# Patient Record
Sex: MALE | Race: WHITE | Employment: PART TIME | URBAN - NONMETROPOLITAN AREA
[De-identification: names, ages, dates, MRNs, and addresses within clinical notes are randomized per-mention and may not be internally consistent; named-entity substitution may affect disease eponyms.]

---

## 2021-01-07 ENCOUNTER — HOSPITAL ENCOUNTER (OUTPATIENT)
Dept: CT IMAGING | Age: 51
Discharge: HOME | End: 2021-01-07
Payer: MEDICAID

## 2021-01-07 DIAGNOSIS — R10.84 ABDOMINAL PAIN, GENERALIZED: ICD-10-CM

## 2021-01-07 PROCEDURE — 6360000004 HC RX CONTRAST MEDICATION: Performed by: PHYSICIAN ASSISTANT

## 2021-01-07 PROCEDURE — 74177 CT ABD & PELVIS W/CONTRAST: CPT

## 2021-01-07 RX ADMIN — IOPAMIDOL 75 ML: 755 INJECTION, SOLUTION INTRAVENOUS at 13:22

## 2021-01-07 RX ADMIN — IOHEXOL 50 ML: 240 INJECTION, SOLUTION INTRATHECAL; INTRAVASCULAR; INTRAVENOUS; ORAL at 13:21

## 2021-02-03 ENCOUNTER — HOSPITAL ENCOUNTER (EMERGENCY)
Age: 51
Discharge: HOME | End: 2021-02-03
Attending: STUDENT IN AN ORGANIZED HEALTH CARE EDUCATION/TRAINING PROGRAM
Payer: MEDICAID

## 2021-02-03 ENCOUNTER — APPOINTMENT (OUTPATIENT)
Dept: ULTRASOUND IMAGING | Age: 51
End: 2021-02-03
Payer: MEDICAID

## 2021-02-03 VITALS
DIASTOLIC BLOOD PRESSURE: 84 MMHG | HEART RATE: 83 BPM | WEIGHT: 182 LBS | SYSTOLIC BLOOD PRESSURE: 148 MMHG | OXYGEN SATURATION: 98 % | TEMPERATURE: 98.9 F | RESPIRATION RATE: 16 BRPM | HEIGHT: 65 IN | BODY MASS INDEX: 30.32 KG/M2

## 2021-02-03 DIAGNOSIS — R19.7 DIARRHEA, UNSPECIFIED TYPE: ICD-10-CM

## 2021-02-03 DIAGNOSIS — R10.11 ABDOMINAL PAIN, RIGHT UPPER QUADRANT: Primary | ICD-10-CM

## 2021-02-03 DIAGNOSIS — R11.2 NAUSEA AND VOMITING, INTRACTABILITY OF VOMITING NOT SPECIFIED, UNSPECIFIED VOMITING TYPE: ICD-10-CM

## 2021-02-03 LAB
A/G RATIO: 1.7 (ref 1.1–2.2)
ALBUMIN SERPL-MCNC: 4.3 G/DL (ref 3.4–5)
ALP BLD-CCNC: 106 U/L (ref 40–129)
ALT SERPL-CCNC: 24 U/L (ref 10–40)
ANION GAP SERPL CALCULATED.3IONS-SCNC: 9 MMOL/L (ref 3–16)
AST SERPL-CCNC: 15 U/L (ref 15–37)
BASOPHILS ABSOLUTE: 0.3 K/UL (ref 0–0.2)
BASOPHILS RELATIVE PERCENT: 2.5 %
BILIRUB SERPL-MCNC: 0.3 MG/DL (ref 0–1)
BILIRUBIN URINE: NEGATIVE
BLOOD, URINE: NEGATIVE
BUN BLDV-MCNC: 13 MG/DL (ref 7–20)
CALCIUM SERPL-MCNC: 9.8 MG/DL (ref 8.3–10.6)
CHLORIDE BLD-SCNC: 97 MMOL/L (ref 99–110)
CLARITY: CLEAR
CO2: 26 MMOL/L (ref 21–32)
COLOR: YELLOW
CREAT SERPL-MCNC: 0.6 MG/DL (ref 0.9–1.3)
EKG ATRIAL RATE: 87 BPM
EKG DIAGNOSIS: NORMAL
EKG P AXIS: 56 DEGREES
EKG P-R INTERVAL: 182 MS
EKG Q-T INTERVAL: 356 MS
EKG QRS DURATION: 98 MS
EKG QTC CALCULATION (BAZETT): 428 MS
EKG R AXIS: 77 DEGREES
EKG T AXIS: 80 DEGREES
EKG VENTRICULAR RATE: 87 BPM
EOSINOPHILS ABSOLUTE: 0.2 K/UL (ref 0–0.6)
EOSINOPHILS RELATIVE PERCENT: 1.3 %
GFR AFRICAN AMERICAN: >60
GFR NON-AFRICAN AMERICAN: >60
GLOBULIN: 2.6 G/DL
GLUCOSE BLD-MCNC: 305 MG/DL (ref 70–99)
GLUCOSE URINE: >=1000 MG/DL
HCT VFR BLD CALC: 48.7 % (ref 40.5–52.5)
HEMOGLOBIN: 16.6 G/DL (ref 13.5–17.5)
KETONES, URINE: NEGATIVE MG/DL
LACTIC ACID: 0.9 MMOL/L (ref 0.4–2)
LEUKOCYTE ESTERASE, URINE: NEGATIVE
LIPASE: 9 U/L (ref 13–60)
LYMPHOCYTES ABSOLUTE: 1.9 K/UL (ref 1–5.1)
LYMPHOCYTES RELATIVE PERCENT: 15.8 %
MCH RBC QN AUTO: 32 PG (ref 26–34)
MCHC RBC AUTO-ENTMCNC: 34.1 G/DL (ref 31–36)
MCV RBC AUTO: 93.9 FL (ref 80–100)
MICROSCOPIC EXAMINATION: ABNORMAL
MONOCYTES ABSOLUTE: 0.7 K/UL (ref 0–1.3)
MONOCYTES RELATIVE PERCENT: 5.6 %
NEUTROPHILS ABSOLUTE: 9.1 K/UL (ref 1.7–7.7)
NEUTROPHILS RELATIVE PERCENT: 74.8 %
NITRITE, URINE: NEGATIVE
PDW BLD-RTO: 13.7 % (ref 12.4–15.4)
PH UA: 6 (ref 5–8)
PLATELET # BLD: 205 K/UL (ref 135–450)
PMV BLD AUTO: 8.2 FL (ref 5–10.5)
POTASSIUM REFLEX MAGNESIUM: 4.3 MMOL/L (ref 3.5–5.1)
PROTEIN UA: NEGATIVE MG/DL
RBC # BLD: 5.18 M/UL (ref 4.2–5.9)
SODIUM BLD-SCNC: 132 MMOL/L (ref 136–145)
SPECIFIC GRAVITY UA: 1.02 (ref 1–1.03)
TOTAL PROTEIN: 6.9 G/DL (ref 6.4–8.2)
TROPONIN: <0.01 NG/ML
URINE TYPE: ABNORMAL
UROBILINOGEN, URINE: 0.2 E.U./DL
WBC # BLD: 12.1 K/UL (ref 4–11)

## 2021-02-03 PROCEDURE — 93005 ELECTROCARDIOGRAM TRACING: CPT | Performed by: STUDENT IN AN ORGANIZED HEALTH CARE EDUCATION/TRAINING PROGRAM

## 2021-02-03 PROCEDURE — 99284 EMERGENCY DEPT VISIT MOD MDM: CPT

## 2021-02-03 PROCEDURE — 93010 ELECTROCARDIOGRAM REPORT: CPT | Performed by: INTERNAL MEDICINE

## 2021-02-03 PROCEDURE — 96372 THER/PROPH/DIAG INJ SC/IM: CPT

## 2021-02-03 PROCEDURE — 84484 ASSAY OF TROPONIN QUANT: CPT

## 2021-02-03 PROCEDURE — 85025 COMPLETE CBC W/AUTO DIFF WBC: CPT

## 2021-02-03 PROCEDURE — 80053 COMPREHEN METABOLIC PANEL: CPT

## 2021-02-03 PROCEDURE — 81003 URINALYSIS AUTO W/O SCOPE: CPT

## 2021-02-03 PROCEDURE — 76705 ECHO EXAM OF ABDOMEN: CPT

## 2021-02-03 PROCEDURE — 83605 ASSAY OF LACTIC ACID: CPT

## 2021-02-03 PROCEDURE — 96374 THER/PROPH/DIAG INJ IV PUSH: CPT

## 2021-02-03 PROCEDURE — 96375 TX/PRO/DX INJ NEW DRUG ADDON: CPT

## 2021-02-03 PROCEDURE — 83690 ASSAY OF LIPASE: CPT

## 2021-02-03 PROCEDURE — 6360000002 HC RX W HCPCS: Performed by: STUDENT IN AN ORGANIZED HEALTH CARE EDUCATION/TRAINING PROGRAM

## 2021-02-03 RX ORDER — ONDANSETRON 2 MG/ML
4 INJECTION INTRAMUSCULAR; INTRAVENOUS EVERY 6 HOURS PRN
Status: DISCONTINUED | OUTPATIENT
Start: 2021-02-03 | End: 2021-02-03 | Stop reason: HOSPADM

## 2021-02-03 RX ORDER — VERAPAMIL HYDROCHLORIDE 240 MG/1
TABLET, FILM COATED, EXTENDED RELEASE ORAL
COMMUNITY
Start: 2020-12-29

## 2021-02-03 RX ORDER — GABAPENTIN 100 MG/1
CAPSULE ORAL
COMMUNITY
Start: 2020-12-18

## 2021-02-03 RX ORDER — DICYCLOMINE HYDROCHLORIDE 10 MG/1
10 CAPSULE ORAL 4 TIMES DAILY
Qty: 40 CAPSULE | Refills: 0 | Status: SHIPPED | OUTPATIENT
Start: 2021-02-03 | End: 2021-02-13

## 2021-02-03 RX ORDER — ONDANSETRON 2 MG/ML
4 INJECTION INTRAMUSCULAR; INTRAVENOUS ONCE
Status: COMPLETED | OUTPATIENT
Start: 2021-02-03 | End: 2021-02-03

## 2021-02-03 RX ORDER — SERTRALINE HYDROCHLORIDE 100 MG/1
TABLET, FILM COATED ORAL
COMMUNITY
Start: 2020-12-29

## 2021-02-03 RX ORDER — MORPHINE SULFATE 4 MG/ML
4 INJECTION, SOLUTION INTRAMUSCULAR; INTRAVENOUS ONCE
Status: COMPLETED | OUTPATIENT
Start: 2021-02-03 | End: 2021-02-03

## 2021-02-03 RX ORDER — SILDENAFIL 100 MG/1
TABLET, FILM COATED ORAL
COMMUNITY
Start: 2021-01-06

## 2021-02-03 RX ORDER — OXYCODONE AND ACETAMINOPHEN 10; 325 MG/1; MG/1
1 TABLET ORAL EVERY 6 HOURS PRN
COMMUNITY

## 2021-02-03 RX ORDER — KETOROLAC TROMETHAMINE 30 MG/ML
15 INJECTION, SOLUTION INTRAMUSCULAR; INTRAVENOUS ONCE
Status: COMPLETED | OUTPATIENT
Start: 2021-02-03 | End: 2021-02-03

## 2021-02-03 RX ORDER — ONDANSETRON 4 MG/1
4 TABLET, ORALLY DISINTEGRATING ORAL EVERY 8 HOURS PRN
Qty: 21 TABLET | Refills: 0 | Status: SHIPPED | OUTPATIENT
Start: 2021-02-03

## 2021-02-03 RX ORDER — TRAZODONE HYDROCHLORIDE 100 MG/1
TABLET ORAL
COMMUNITY
Start: 2020-10-29

## 2021-02-03 RX ORDER — DULAGLUTIDE 1.5 MG/.5ML
INJECTION, SOLUTION SUBCUTANEOUS
COMMUNITY
Start: 2020-12-16

## 2021-02-03 RX ORDER — INSULIN GLARGINE 100 [IU]/ML
INJECTION, SOLUTION SUBCUTANEOUS
COMMUNITY
Start: 2020-12-16

## 2021-02-03 RX ORDER — DICYCLOMINE HYDROCHLORIDE 10 MG/ML
20 INJECTION INTRAMUSCULAR ONCE
Status: COMPLETED | OUTPATIENT
Start: 2021-02-03 | End: 2021-02-03

## 2021-02-03 RX ADMIN — DICYCLOMINE HYDROCHLORIDE 20 MG: 20 INJECTION, SOLUTION INTRAMUSCULAR at 11:57

## 2021-02-03 RX ADMIN — ONDANSETRON HYDROCHLORIDE 4 MG: 2 INJECTION, SOLUTION INTRAMUSCULAR; INTRAVENOUS at 14:24

## 2021-02-03 RX ADMIN — MORPHINE SULFATE 4 MG: 4 INJECTION INTRAVENOUS at 14:24

## 2021-02-03 RX ADMIN — KETOROLAC TROMETHAMINE 15 MG: 30 INJECTION, SOLUTION INTRAMUSCULAR at 11:56

## 2021-02-03 ASSESSMENT — PAIN DESCRIPTION - LOCATION
LOCATION: ABDOMEN
LOCATION: ABDOMEN

## 2021-02-03 ASSESSMENT — PAIN SCALES - GENERAL
PAINLEVEL_OUTOF10: 7
PAINLEVEL_OUTOF10: 7

## 2021-02-03 NOTE — ED PROVIDER NOTES
[address] ED      CHIEF COMPLAINT  Abdominal Pain (john 10 was here sent to [facility]; needs gallbladder removed but was septic so was not removed)       HISTORY OF [address] Hu[address]h is a 52 y.o. male with past medical history of cholecystitis, lumbar spine problems, arthritis who presents to the ED complaining of upper abdominal pain. Onset of pain: worse for 1.5d, chronic pain since January and previous cholecystitis diagnosis  Location: RUQ  Radiation: radiates to epigastrum and chest  Quality: sharp stabbing  Severity: severe  Timing: constant  Palliative Factors: pressure  Provocative Factors: movement    Chest pain- One episode of sharp pain. Brief. Not having pain now. No palliative or provocative factors. Does smoke. Denies cardiac history. Nausea/Vomiting: 3 episodes NBNB emesis (one episode that was black a few days ago), last episode 2hr PTA, no hematemesis today  Diarrhea/Constipation: intermittent diarrhea; Last BM: today  Dysuria/urinary frequency: yes for 3d    No other complaints, modifying factors or associated symptoms. I have reviewed the following from the nursing documentation. History reviewed. No pertinent past medical history. History reviewed. No pertinent surgical history. History reviewed. No pertinent family history.   Social History     Socioeconomic History    Marital status: Single     Spouse name: Not on file    Number of children: Not on file    Years of education: Not on file    Highest education level: Not on file   Occupational History    Not on file   Social Needs    Financial resource strain: Not on file    Food insecurity     Worry: Not on file     Inability: Not on file    Transportation needs     Medical: Not on file     Non-medical: Not on file   Tobacco Use    Smoking status: Current Every Day Smoker     Packs/day: 2.00     Types: Cigarettes    Smokeless tobacco: Never Used   Substance and Sexual Activity    Alcohol use: Not Currently     Comment: rcovery    Drug use: Not Currently    Sexual activity: Not on file   Lifestyle    Physical activity     Days per week: Not on file     Minutes per session: Not on file    Stress: Not on file   Relationships    Social connections     Talks on phone: Not on file     Gets together: Not on file     Attends Yazidi service: Not on file     Active member of club or organization: Not on file     Attends meetings of clubs or organizations: Not on file     Relationship status: Not on file    Intimate partner violence     Fear of current or ex partner: Not on file     Emotionally abused: Not on file     Physically abused: Not on file     Forced sexual activity: Not on file   Other Topics Concern    Not on file   Social History Narrative    Not on file     Current Facility-Administered Medications   Medication Dose Route Frequency Provider Last Rate Last Admin    ondansetron (ZOFRAN) injection 4 mg  4 mg Intravenous Q6H PRN Juanita Rivas MD        ketorolac (TORADOL) injection 15 mg  15 mg Intravenous Once Juanita Rivas MD        dicyclomine (BENTYL) injection 20 mg  20 mg Intramuscular Once Juanita Rivas MD         Current Outpatient Medications   Medication Sig Dispense Refill    traZODone (DESYREL) 100 MG tablet       TRULICITY 1.5 VK/8.7YP SOPN       LANTUS SOLOSTAR 100 UNIT/ML injection pen       gabapentin (NEURONTIN) 100 MG capsule       oxyCODONE-acetaminophen (PERCOCET)  MG per tablet Take 1 tablet by mouth every 6 hours as needed.  sertraline (ZOLOFT) 100 MG tablet       verapamil (CALAN SR) 240 MG extended release tablet       sildenafil (VIAGRA) 100 MG tablet        Allergies   Allergen Reactions    Latex Swelling and Rash       REVIEW OF SYSTEMS  10 systems reviewed, pertinent positives per HPI otherwise noted to be negative.     PHYSICAL EXAM  /75   Temp 93 °F (33.9 °C)   Resp 18   Ht 5' 5\" (1.651 m)   Wt 182 lb (82.6 kg)   SpO2 98%   BMI 30.29 kg/m²    GENERAL APPEARANCE: Awake and alert. Cooperative. HENT: Normocephalic. Atraumatic. Mucous membranes are moist  NECK: Supple. Full range of motion without pain or stiffness  EYES: PERRL. EOM's grossly intact. HEART/CHEST: RRR. No murmurs. LUNGS: Respirations unlabored. CTAB. Good air exchange. Speaking comfortably in full sentences. ABDOMEN: Tender in the epigastric region right upper quadrant. Soft. Non-distended. No masses. No organomegaly. No guarding or rebound. MUSCULOSKELETAL: No extremity edema. Compartments soft. No deformity. No tenderness in the extremities. All extremities neurovascularly intact. SKIN: Warm and dry. No acute rashes. NEUROLOGICAL: Alert and oriented. CN's 2-12 intact. No gross facial drooping. Strength 5/5, sensation intact. PSYCHIATRIC: Normal mood and affect. LABS  I have reviewed all labs for this visit.    Results for orders placed or performed during the hospital encounter of 02/03/21   CBC Auto Differential   Result Value Ref Range    WBC 12.1 (H) 4.0 - 11.0 K/uL    RBC 5.18 4.20 - 5.90 M/uL    Hemoglobin 16.6 13.5 - 17.5 g/dL    Hematocrit 48.7 40.5 - 52.5 %    MCV 93.9 80.0 - 100.0 fL    MCH 32.0 26.0 - 34.0 pg    MCHC 34.1 31.0 - 36.0 g/dL    RDW 13.7 12.4 - 15.4 %    Platelets 090 300 - 295 K/uL    MPV 8.2 5.0 - 10.5 fL    Neutrophils % 74.8 %    Lymphocytes % 15.8 %    Monocytes % 5.6 %    Eosinophils % 1.3 %    Basophils % 2.5 %    Neutrophils Absolute 9.1 (H) 1.7 - 7.7 K/uL    Lymphocytes Absolute 1.9 1.0 - 5.1 K/uL    Monocytes Absolute 0.7 0.0 - 1.3 K/uL    Eosinophils Absolute 0.2 0.0 - 0.6 K/uL    Basophils Absolute 0.3 (H) 0.0 - 0.2 K/uL   Comprehensive Metabolic Panel w/ Reflex to MG   Result Value Ref Range    Sodium 132 (L) 136 - 145 mmol/L    Potassium reflex Magnesium 4.3 3.5 - 5.1 mmol/L    Chloride 97 (L) 99 - 110 mmol/L    CO2 26 21 - 32 mmol/L    Anion Gap 9 3 - 16    Glucose 305 (H) 70 - 99 mg/dL    BUN 13 7 - 20 mg/dL    CREATININE 0.6 (L) 0.9 - 1.3 mg/dL    GFR Non-African American >60 >60    GFR African American >60 >60    Calcium 9.8 8.3 - 10.6 mg/dL    Total Protein 6.9 6.4 - 8.2 g/dL    Albumin 4.3 3.4 - 5.0 g/dL    Albumin/Globulin Ratio 1.7 1.1 - 2.2    Total Bilirubin 0.3 0.0 - 1.0 mg/dL    Alkaline Phosphatase 106 40 - 129 U/L    ALT 24 10 - 40 U/L    AST 15 15 - 37 U/L    Globulin 2.6 g/dL   Lipase   Result Value Ref Range    Lipase 9.0 (L) 13.0 - 60.0 U/L   Urinalysis, reflex to microscopic   Result Value Ref Range    Color, UA Yellow Straw/Yellow    Clarity, UA Clear Clear    Glucose, Ur >=1000 (A) Negative mg/dL    Bilirubin Urine Negative Negative    Ketones, Urine Negative Negative mg/dL    Specific Gravity, UA 1.020 1.005 - 1.030    Blood, Urine Negative Negative    pH, UA 6.0 5.0 - 8.0    Protein, UA Negative Negative mg/dL    Urobilinogen, Urine 0.2 <2.0 E.U./dL    Nitrite, Urine Negative Negative    Leukocyte Esterase, Urine Negative Negative    Microscopic Examination Not Indicated     Urine Type NotGiven    Lactic Acid, Plasma   Result Value Ref Range    Lactic Acid 0.9 0.4 - 2.0 mmol/L   Troponin   Result Value Ref Range    Troponin <0.01 <0.01 ng/mL   EKG 12 Lead   Result Value Ref Range    Ventricular Rate 87 BPM    Atrial Rate 87 BPM    P-R Interval 182 ms    QRS Duration 98 ms    Q-T Interval 356 ms    QTc Calculation (Bazett) 428 ms    P Axis 56 degrees    R Axis 77 degrees    T Axis 80 degrees    Diagnosis       Normal sinus rhythmNormal ECGNo previous ECGs availableConfirmed by MATT FRANK, HELADIO ([id number]) on 2/3/2021 4:59:30 PM       ECG  The Ekg interpreted by me shows  normal sinus rhythm with a rate of 87  Axis is   Normal  QTc is  within an acceptable range  Intervals and Durations are unremarkable. ST Segments: nonspecific changes  No previous for comparison. RADIOLOGY  US GALLBLADDER RUQ   Preliminary Result   Essentially unremarkable right upper quadrant ultrasound with no evidence of   cholelithiasis. ED COURSE / [facility]  Patient seen and evaluated. Old records reviewed. Labs and imaging reviewed and results discussed with patient. Overall, well appearing patient in no distress presenting for RUQ abdominal pain. Patient also with intermittent vomiting and diarrhea. Had cholecystitis in January. Physical exam remarkable for RUQ tenderness to palpation. Differential diagnosis includes but is not limited to: gastroenteritis, peptic ulcer disease, cholecystitis, pancreatitis, hepatitis or other liver disease, low suspicion for Appendicitis, bowel obstruction,  diverticulitis, hernia,  UTI, AAA, ACS    Laboratory studies obtained. At this time, I do not feel that CT scan of the abdomen and pelvis is indicated, patient had a CT scan in January which showed only:  Nonobstructing right nephrolithiasis.       Indeterminate 2 mm right lower lobe pulmonary nodules. Patient has a history of recent gallbladder pathology, and the location of his pain is consistent with RUQ, feel that US is most appropriate imaging modality at this time. During the patient's ED course, the patient was given:  Medications   ketorolac (TORADOL) injection 15 mg (15 mg Intravenous Given 2/3/21 1156)   dicyclomine (BENTYL) injection 20 mg (20 mg Intramuscular Given 2/3/21 1157)   ondansetron (ZOFRAN) injection 4 mg (4 mg Intravenous Given 2/3/21 1424)   morphine sulfate (PF) injection 4 mg (4 mg Intravenous Given 2/3/21 1424)        ED Course as of Feb 05 1435   Wed Feb 03, 2021   Urinalysis with glucose, no blood or infection. Low suspicion for UTI or kidney stone.      [ER]   Troponin within normal limits. ECG without evidence of ischemia. Patient reported 1 brief episode of sharp chest pain that does not sound like ACS based on description, low suspicion for ACS.     Furthermore, patient is not having any cardiopulmonary symptoms at this time and only had 1 brief episode of chest pain that occurred days ago, do not feel that further workup for this incidental symptom that came up on ROS is indicated at this time. Patient provided with return precautions if he has cardiopulmonary symptoms. [ER]   Mild hyponatremia and hypochloremia, elevated glucose. No evidence of kidney dysfunction.    [ER]   Liver function testing unremarkable. US showed liver with normal echogenicity without evidence of intrahepatic biliary ductal dilatation. Low suspicion for hepatitis, choledocholithiasis, or other acute liver pathology. [ER]   Mild leukocytosis, no anemia or thrombocytopenia. [ER]   Lactate within normal limits. [ER]   Lipase is low, low suspicion for pancreatitis. US and previous CT showed evidence of pancreatic atrophy. Do not feel this is the cause of his symptoms today. [ER]   RUQ US: IMPRESSION:  Essentially unremarkable right upper quadrant ultrasound with no evidence of  Cholelithiasis.    -----------------------------------  No evidence of cholelithiasis or cholecystitis. [ER]      ED Course User Index  [ER] Juanita Rivas MD      Glucose was elevated. Other labs reassuring that patient is not in DKA. Based of location of pain and symptoms, I have low suspicion for appendicitis, colitis, SBO. Work-up unremarkable. The exact cause of patient's pain is unclear at this time but based of location, I suspect gallbladder, possibly biliary colic. Labs and imaging do not indicated emergent pathology at this time. Patient given symptomatic treatment in the ED with improvement of symptoms. Prescriptions for bentyl and zofran given. At this time, feel the patient is appropriate for discharge to follow-up with a primary care doctor. Patient feels comfortable with discharge at this time. Return precautions given. Encouraged PCP follow-up in 2d. Given surgery referral if patient continues to have persistent RUQ pain. Patient discharged in stable condition.     I estimate there is LOW risk for ACUTE APPENDICITIS, [address]  [phone]  Go to   As needed, If symptoms worsen      DISCLAIMER: This chart was created using 38397  STYLIGHT. Efforts were made by me to ensure accuracy, however some errors may be present due to limitations of this technology and occasionally words are not transcribed correctly.       Armando Ac MD  02/05/21 [id number]

## 2021-06-09 ENCOUNTER — HOSPITAL ENCOUNTER (OUTPATIENT)
Dept: OCCUPATIONAL THERAPY | Age: 51
Setting detail: THERAPIES SERIES
Discharge: HOME | End: 2021-06-09
Payer: MEDICAID

## 2021-06-09 PROCEDURE — 97750 PHYSICAL PERFORMANCE TEST: CPT

## 2021-06-09 NOTE — PROGRESS NOTES
Occupational Therapy    Client was seen today for a FCE at [facility] from 8:05 am to 1258 pm with two breaks totaling 15 mins. .  Please look under media tab for the eval document.     Poppy Brown Reyes 89, OTR/L  #[id number]

## 2023-01-31 ENCOUNTER — TELEPHONE (OUTPATIENT)
Dept: ORTHOPEDIC SURGERY | Age: 53
End: 2023-01-31

## 2023-01-31 SDOH — HEALTH STABILITY: PHYSICAL HEALTH: ON AVERAGE, HOW MANY DAYS PER WEEK DO YOU ENGAGE IN MODERATE TO STRENUOUS EXERCISE (LIKE A BRISK WALK)?: 0 DAYS

## 2023-01-31 SDOH — HEALTH STABILITY: PHYSICAL HEALTH: ON AVERAGE, HOW MANY MINUTES DO YOU ENGAGE IN EXERCISE AT THIS LEVEL?: 0 MIN

## 2023-01-31 NOTE — TELEPHONE ENCOUNTER
Other PATIENT'S SISTER IS CALLING TO LET THE OFFICE KNOW THAT PATIENT WAS NOT ABLE TO GET MRI DUE TO IT NOT BEING APPROVED.

## 2023-02-01 ENCOUNTER — OFFICE VISIT (OUTPATIENT)
Dept: ORTHOPEDIC SURGERY | Age: 53
End: 2023-02-01
Payer: MEDICAID

## 2023-02-01 VITALS — HEIGHT: 65 IN | WEIGHT: 182 LBS | BODY MASS INDEX: 30.32 KG/M2

## 2023-02-01 DIAGNOSIS — M51.16 LUMBAR DISC HERNIATION WITH RADICULOPATHY: Primary | ICD-10-CM

## 2023-02-01 PROCEDURE — G8427 DOCREV CUR MEDS BY ELIG CLIN: HCPCS | Performed by: ORTHOPAEDIC SURGERY

## 2023-02-01 PROCEDURE — G8417 CALC BMI ABV UP PARAM F/U: HCPCS | Performed by: ORTHOPAEDIC SURGERY

## 2023-02-01 PROCEDURE — G8484 FLU IMMUNIZE NO ADMIN: HCPCS | Performed by: ORTHOPAEDIC SURGERY

## 2023-02-01 PROCEDURE — 99203 OFFICE O/P NEW LOW 30 MIN: CPT | Performed by: ORTHOPAEDIC SURGERY

## 2023-02-01 PROCEDURE — 4004F PT TOBACCO SCREEN RCVD TLK: CPT | Performed by: ORTHOPAEDIC SURGERY

## 2023-02-01 PROCEDURE — 3017F COLORECTAL CA SCREEN DOC REV: CPT | Performed by: ORTHOPAEDIC SURGERY

## 2023-02-01 NOTE — PROGRESS NOTES
New Patient: LUMBAR SPINE    Referring Provider:  No ref. provider found    CHIEF COMPLAINT:    Chief Complaint   Patient presents with    Lower Back Pain     Reports of HX of lower back pain with numbness and tingling into Right leg, patient has history of ROJAS 6 months ago with no changes. HISTORY OF PRESENT ILLNESS:    Mr. Anahi John  is a pleasant 50 y.o. male kindly referred by Trey Recio PA-C for the evaluation of low back and right leg pain. .  The symptoms began insidiously about 20 years ago and have increased substantially over the last 3 weeks. Ramonita Pacheco He rates his low back and right leg pain 7/10. Notes numbness tingling weakness in L5 distribution on the right he denies saddle anesthesia and bowel or bladder abnormality. Current/Past Treatment:   Physical Therapy: Yes  Chiropractic: Yes  Injection: Yes  Medications: Gabapentin and Percocet    Past Medical History:   History reviewed. No pertinent past medical history. Past Surgical History:     History reviewed. No pertinent surgical history. Current Medications:     Current Outpatient Medications:     traZODone (DESYREL) 100 MG tablet, , Disp: , Rfl:     TRULICITY 1.5 XT/0.7QQ SOPN, , Disp: , Rfl:     LANTUS SOLOSTAR 100 UNIT/ML injection pen, , Disp: , Rfl:     gabapentin (NEURONTIN) 100 MG capsule, , Disp: , Rfl:     oxyCODONE-acetaminophen (PERCOCET)  MG per tablet, Take 1 tablet by mouth every 6 hours as needed. , Disp: , Rfl:     sertraline (ZOLOFT) 100 MG tablet, , Disp: , Rfl:     verapamil (CALAN SR) 240 MG extended release tablet, , Disp: , Rfl:     sildenafil (VIAGRA) 100 MG tablet, , Disp: , Rfl:     dicyclomine (BENTYL) 10 MG capsule, Take 1 capsule by mouth 4 times daily for 10 days, Disp: 40 capsule, Rfl: 0    ondansetron (ZOFRAN ODT) 4 MG disintegrating tablet, Take 1 tablet by mouth every 8 hours as needed for Nausea or Vomiting, Disp: 21 tablet, Rfl: 0  Allergies:  Latex  Social History:    reports that he has been smoking cigarettes. He has been smoking an average of 2 packs per day. He has never used smokeless tobacco. He reports that he does not currently use alcohol. He reports that he does not currently use drugs. Family History:   History reviewed. No pertinent family history. REVIEW OF SYSTEMS: Full ROS noted & scanned   CONSTITUTIONAL: Denies unexplained weight loss, fevers, chills or fatigue  NEUROLOGICAL: Denies unsteady gait or progressive weakness  MUSCULOSKELETAL: Denies joint swelling or redness  PSYCHOLOGICAL: Denies anxiety, depression   SKIN: Denies skin changes, delayed healing, rash, itching   HEMATOLOGIC: Denies easy bleeding or bruising  ENDOCRINE: Denies excessive thirst, urination, heat/cold  RESPIRATORY: Denies current dyspnea, cough  GI: Denies nausea, vomiting, diarrhea   : Denies bowel or bladder issues      PHYSICAL EXAM:    Vitals: Height 5' 5\" (1.651 m), weight 182 lb (82.6 kg). GENERAL EXAM:  General Apparence: Patient is adequately groomed with no evidence of malnutrition. Orientation: The patient is oriented to time, place and person. Mood & Affect:The patient's mood and affect are appropriate. Vascular: Examination reveals no swelling tenderness in upper or lower extremities. Good capillary refill. Lymphatic: The lymphatic examination bilaterally reveals all areas to be without enlargement or induration  Sensation: Sensation is intact without deficit  Coordination/Balance: Good coordination     LUMBAR/SACRAL EXAMINATION:  Inspection: Local inspection shows no step-off or bruising. Lumbar alignment is normal.  Sagittal and Coronal balance is neutral.      Palpation:   No evidence of tenderness at the midline. No tenderness bilaterally at the paraspinal or trochanters. There is no step-off or paraspinal spasm. Range of Motion: Lumbar flexion, extension and rotation are mildly limited due to pain. Strength:   Strength testing is 5/5 in all muscle groups tested.    Special Tests:   Straight leg raise and crossed SLR negative. Leg length and pelvis level. Skin: There are no rashes, ulcerations or lesions. Reflexes: Reflexes are symmetrically 2+ at the patellar and ankle tendons. Clonus absent bilaterally at the feet. Gait & station: normal, patient ambulates without assistance    Additional Examinations:   RIGHT LOWER EXTREMITY: Inspection/examination of the right lower extremity does not show any tenderness, deformity or injury. Range of motion is unremarkable. There is no gross instability. There are no rashes, ulcerations or lesions. Strength and tone are normal.    LEFT LOWER EXTREMITY:  Inspection/examination of the left lower extremity does not show any tenderness, deformity or injury. Range of motion is unremarkable. There is no gross instability. There are no rashes, ulcerations or lesions. Strength and tone are normal.    Diagnostic Testing:    I reviewed CT images of his lumbar spine from 1/19/2023 in the office today. Those show an ossified disc protrusion right L4-L5    I reviewed CT images of his pelvis from 1/19/2023 in the office today. Those  are essentially normal    Impression:   Chronic lumbar disc herniation right L4-L5    Plan:    Discussed treatment options including observation, physical therapy, epidural injection, and additional imaging. He would like to proceed with lumbar MRI.

## 2023-02-04 ENCOUNTER — APPOINTMENT (OUTPATIENT)
Dept: GENERAL RADIOLOGY | Age: 53
End: 2023-02-04
Payer: MEDICAID

## 2023-02-04 ENCOUNTER — HOSPITAL ENCOUNTER (EMERGENCY)
Age: 53
Discharge: HOME | End: 2023-02-04
Attending: EMERGENCY MEDICINE
Payer: MEDICAID

## 2023-02-04 VITALS
TEMPERATURE: 98.3 F | OXYGEN SATURATION: 98 % | RESPIRATION RATE: 18 BRPM | WEIGHT: 180 LBS | HEIGHT: 65 IN | SYSTOLIC BLOOD PRESSURE: 126 MMHG | DIASTOLIC BLOOD PRESSURE: 65 MMHG | HEART RATE: 98 BPM | BODY MASS INDEX: 29.99 KG/M2

## 2023-02-04 DIAGNOSIS — M54.30 SCIATICA, UNSPECIFIED LATERALITY: Primary | ICD-10-CM

## 2023-02-04 PROCEDURE — 99283 EMERGENCY DEPT VISIT LOW MDM: CPT

## 2023-02-04 PROCEDURE — 72100 X-RAY EXAM L-S SPINE 2/3 VWS: CPT

## 2023-02-04 RX ORDER — PREDNISONE 10 MG/1
TABLET ORAL
Qty: 30 TABLET | Refills: 0 | Status: SHIPPED | OUTPATIENT
Start: 2023-02-04 | End: 2023-02-14

## 2023-02-04 RX ORDER — HYDROCODONE BITARTRATE AND ACETAMINOPHEN 5; 325 MG/1; MG/1
1 TABLET ORAL EVERY 8 HOURS PRN
Qty: 5 TABLET | Refills: 0 | Status: SHIPPED | OUTPATIENT
Start: 2023-02-04 | End: 2023-02-07

## 2023-02-04 RX ORDER — HYDROCODONE BITARTRATE AND ACETAMINOPHEN 5; 325 MG/1; MG/1
1 TABLET ORAL EVERY 8 HOURS PRN
Qty: 5 TABLET | Refills: 0 | Status: SHIPPED | OUTPATIENT
Start: 2023-02-04 | End: 2023-02-04

## 2023-02-04 RX ORDER — METHYLPREDNISOLONE 4 MG/1
TABLET ORAL
Qty: 1 KIT | Refills: 0 | Status: SHIPPED | OUTPATIENT
Start: 2023-02-04 | End: 2023-02-04

## 2023-02-04 ASSESSMENT — PAIN DESCRIPTION - DIRECTION: RADIATING_TOWARDS: DOWN RIGHT LEG

## 2023-02-04 ASSESSMENT — PAIN DESCRIPTION - PAIN TYPE
TYPE: CHRONIC PAIN
TYPE: ACUTE PAIN

## 2023-02-04 ASSESSMENT — PAIN DESCRIPTION - ORIENTATION: ORIENTATION: LOWER;RIGHT

## 2023-02-04 ASSESSMENT — PAIN DESCRIPTION - LOCATION: LOCATION: BACK

## 2023-02-04 ASSESSMENT — PAIN DESCRIPTION - FREQUENCY: FREQUENCY: CONTINUOUS

## 2023-02-04 ASSESSMENT — PAIN SCALES - GENERAL: PAINLEVEL_OUTOF10: 7

## 2023-02-04 ASSESSMENT — PAIN - FUNCTIONAL ASSESSMENT
PAIN_FUNCTIONAL_ASSESSMENT: 0-10
PAIN_FUNCTIONAL_ASSESSMENT: ACTIVITIES ARE NOT PREVENTED

## 2023-02-04 ASSESSMENT — PAIN DESCRIPTION - DESCRIPTORS: DESCRIPTORS: ACHING;THROBBING

## 2023-02-04 ASSESSMENT — PAIN DESCRIPTION - ONSET: ONSET: ON-GOING

## 2023-02-04 NOTE — ED NOTES
Pt requested medications to be sent to another pharmacy, [facility] in [place] that way pt is able to  scripts tomorrow. Pharmacy was changed and MD made aware with new scripts sent to [facility]. Pt also requesting pain medications at discharge. MD made aware and states that he will not place additional orders for pain medications d/t his dose of 100mg of fentanyl with EMS.       Nichole Flower RN  02/04/23 [id number]

## 2023-02-04 NOTE — ED NOTES
Per EMS report, pt called EMS with chronic severe lower back/sciatica pain from a fall from significant height approx 20 years ago Pt has had follow up with spine surgeon on 2/1/23 and awaiting surgery and MRI. EMS gave 100 mcg of fentanyl and 4 of zofran given PIV.           Jenni Grajeda RN  02/04/23 [id number]

## 2023-02-04 NOTE — ED NOTES
Discharge instructions and medications reviewed with patient. Patient verbalized understanding of medications and follow up. All questions answered at this time. Skin warm, pink, and dry. Patient alert and oriented x4. Pt ambulated to lobby via wheelchair. Patient discharged home with 2 prescriptions.        Aniyah Maxwell RN  02/04/23 [id number]

## 2023-02-04 NOTE — ED PROVIDER NOTES
EMERGENCY DEPARTMENT ENCOUNTER    CHIEF COMPLAINT    Chief Complaint   Patient presents with    Back Pain     Right lower back pain radiating down right leg. \"I have sciatica and have had this pain for a couple of weeks. I had a CT scan done and I brought it with me. \"  Patient advises he ran out of Vicodin yesterday. HPI    Eddy Holm is a 50 y.o. male who presents with back pain, localized in the lower region of the back. The onset was some time ago (3-4 weeks. Althought the nurses report EMS reported this had been going on for > 1 year). The duration has been constant for the past 3 weeks. . The quality of the pain is sharp. The pain worsens with movement. The context is this patient reports that he has been having sciatica for several weeks if not greater than a month. He seen his primary care provider and also seen a back surgeon. He underwent a CT of his back which he reports shows some disease and are waiting to get an MRI for suspected sciatica. Patient states that he ran out of his medications today that his doctor prescribed the other day. . Patient denies history of malignancy, IV drug use, or recent surgery. No history of uncontrolled diabetes, HIV or immunosuppressant therapy. The patient received 100 mcg of fentanyl in route via EMS    REVIEW OF SYSTEMS    General: No fevers, chills or night sweats, No weight loss    GI: No abdominal pain or vomiting    : No dysuria or hematuria    Musculoskeletal: see HPI, No unrelenting pain or night pain    Neurologic: No bowel or bladder incontinence, No saddle anesthesia, No leg weakness    All other systems reviewed and are negative.     PAST MEDICAL & SURGICAL HISTORY    Past Medical History:   Diagnosis Date    Gastroparesis        Past Surgical History:   Procedure Laterality Date    GALLBLADDER SURGERY         CURRENT MEDICATIONS (may include discharge medications prescribed in the ED)    Current Outpatient Rx   Medication Sig Dispense Refill    traZODone (DESYREL) 100 MG tablet       TRULICITY 1.5 TH/6.1NG SOPN       LANTUS SOLOSTAR 100 UNIT/ML injection pen       gabapentin (NEURONTIN) 100 MG capsule       oxyCODONE-acetaminophen (PERCOCET)  MG per tablet Take 1 tablet by mouth every 6 hours as needed.       sertraline (ZOLOFT) 100 MG tablet       verapamil (CALAN SR) 240 MG extended release tablet       sildenafil (VIAGRA) 100 MG tablet       dicyclomine (BENTYL) 10 MG capsule Take 1 capsule by mouth 4 times daily for 10 days 40 capsule 0    ondansetron (ZOFRAN ODT) 4 MG disintegrating tablet Take 1 tablet by mouth every 8 hours as needed for Nausea or Vomiting 21 tablet 0       ALLERGIES    Allergies   Allergen Reactions    Latex Swelling and Rash       SOCIAL HISTORY    Social History     Socioeconomic History    Marital status: Single     Spouse name: None    Number of children: None    Years of education: None    Highest education level: None   Tobacco Use    Smoking status: Every Day     Packs/day: 2.00     Types: Cigarettes    Smokeless tobacco: Never   Vaping Use    Vaping Use: Never used   Substance and Sexual Activity    Alcohol use: Not Currently     Comment: rcovery    Drug use: Not Currently    Sexual activity: Not Currently     Social Determinants of Health     Physical Activity: Inactive    Days of Exercise per Week: 0 days    Minutes of Exercise per Session: 0 min   Intimate Partner Violence: Not At Risk    Fear of Current or Ex-Partner: No    Emotionally Abused: No    Physically Abused: No    Sexually Abused: No       PHYSICAL EXAM    VITAL SIGNS: /84   Pulse (!) 108   Temp 98.3 °F (36.8 °C) (Oral)   Resp 16   Ht 5' 5\" (1.651 m)   Wt 180 lb (81.6 kg)   SpO2 95%   BMI 29.95 kg/m²     Constitutional: Well developed, well nourished, no acute distress, non-toxic appearing    HENT: Atraumatic, moist mucus membranes    Neck: No JVD, supple    Respiratory: No respiratory distress, normal breath sounds    Cardiovascular: regular rate, no murmurs    GI: Soft, nontender, no pulsatile masses or bruits of the abdomen    Musculoskeletal: No edema, no acute deformities    Back: + paralumbar tenderness to palpation, no bony midline tenderness, negative straight leg raise test bilaterally    Integument: Well hydrated, no rash    Vascular: Dorsalis pedis pulses are 2+ and equal bilaterally    Neurologic: L4/L5/S1 motor 5/5 bilaterally, patellar and achilles reflexes are 2+ and equal bilaterally, sensation to light touch is intact in the groin and lower extremities bilaterally    RADIOLOGY    XR LUMBAR SPINE (2-3 VIEWS)    (Results Pending)       ED COURSE & MEDICAL DECISION MAKING    Please see EMR for medications administered in the ED. Differential Diagnosis: Spinal Epidural Abscess, Vertebral Osteomyelitis, Cauda Equina Syndrome, Spinal Cord Compression, Conus Medullaris Syndrome, ruptured/dissecting Abdominal Aortic Aneurysm, Metastases to the back, Kidney Stone, Pyelonephritis, other    Patient presenting with complaint of back pain for less than 6 weeks. Patient denies fever, chills, weight loss, worsening of pain at night, unrelenting pain at rest, bowel or bladder retention/incontinence, saddle anesthesia, leg weakness, IV drug use, malignancy, or recent GI/ procedure. No history of immunocompromise (uncontrolled diabetes, chronic steroid/immunosuppressant therapy). On exam, patient is afebrile and nontoxic in appearance with unremarkable vital signs. No evidence of neurological deficit on physical exam.    Due to history of a fall onto his back earlier today, plain films were ordered and were negative for acute pathology.     Due to the unremarkable history and lack of systemic complaints or atypical features, as well as the absence of neurological deficit, I have low suspicion at this time for epidural compression syndrome (spinal cord compression, cauda equina syndrome, conus medullaris syndrome) or infectious etiology (epidural abscess, vertebral osteomyelitis). Additionally, I have low suspicion for ruptured/dissecting abdominal aortic aneurysm, kidney stone or pyelonephritis. Patient's pain is most likely sciatic. I believe the patient is safe for discharge at this time. I will provide symptomatic medications and recommend outpatient follow-up. The patient was instructed to follow up as an outpatient in two days with primary care. The patient was instructed to return to the ED immediately for any new or worsening symptoms, including bowel or bladder incontinence, saddle anesthesia or weakness of the legs. The patient verbalized understanding. FINAL IMPRESSION    No diagnosis found.     PLAN    Discharge with outpatient follow-up (see EMR)    (Please note that this note was completed with a voice recognition program. Every attempt was made to edit the dictations, but inevitably there remain words that are mis-transcribed.)        Lawyer Daniele MD  02/04/23 [id number]

## 2023-02-27 ENCOUNTER — OFFICE VISIT (OUTPATIENT)
Dept: ORTHOPEDIC SURGERY | Age: 53
End: 2023-02-27
Payer: MEDICAID

## 2023-02-27 VITALS — BODY MASS INDEX: 30.32 KG/M2 | HEIGHT: 65 IN | WEIGHT: 182 LBS

## 2023-02-27 DIAGNOSIS — M51.16 LUMBAR DISC HERNIATION WITH RADICULOPATHY: Primary | ICD-10-CM

## 2023-02-27 PROCEDURE — G8484 FLU IMMUNIZE NO ADMIN: HCPCS | Performed by: ORTHOPAEDIC SURGERY

## 2023-02-27 PROCEDURE — 99214 OFFICE O/P EST MOD 30 MIN: CPT | Performed by: ORTHOPAEDIC SURGERY

## 2023-02-27 PROCEDURE — 4004F PT TOBACCO SCREEN RCVD TLK: CPT | Performed by: ORTHOPAEDIC SURGERY

## 2023-02-27 PROCEDURE — G8427 DOCREV CUR MEDS BY ELIG CLIN: HCPCS | Performed by: ORTHOPAEDIC SURGERY

## 2023-02-27 PROCEDURE — 3017F COLORECTAL CA SCREEN DOC REV: CPT | Performed by: ORTHOPAEDIC SURGERY

## 2023-02-27 PROCEDURE — G8417 CALC BMI ABV UP PARAM F/U: HCPCS | Performed by: ORTHOPAEDIC SURGERY

## 2023-02-27 RX ORDER — BLOOD-GLUCOSE TRANSMITTER
EACH MISCELLANEOUS
COMMUNITY
Start: 2023-01-26

## 2023-02-27 RX ORDER — METOCLOPRAMIDE HYDROCHLORIDE 5 MG/5ML
SOLUTION ORAL
COMMUNITY
Start: 2023-02-14

## 2023-02-27 RX ORDER — NITROGLYCERIN 0.4 MG/1
TABLET SUBLINGUAL
COMMUNITY
Start: 2023-01-07

## 2023-02-27 RX ORDER — GLIPIZIDE 10 MG/1
TABLET, FILM COATED, EXTENDED RELEASE ORAL
COMMUNITY
Start: 2023-02-06

## 2023-02-27 RX ORDER — BLOOD-GLUCOSE SENSOR
EACH MISCELLANEOUS
COMMUNITY
Start: 2023-02-16

## 2023-02-27 RX ORDER — ALBUTEROL SULFATE 2.5 MG/3ML
SOLUTION RESPIRATORY (INHALATION)
COMMUNITY
Start: 2023-01-07

## 2023-02-27 RX ORDER — LANCING DEVICE
EACH MISCELLANEOUS
COMMUNITY
Start: 2022-12-19

## 2023-02-27 RX ORDER — CYCLOBENZAPRINE HCL 10 MG
TABLET ORAL
COMMUNITY
Start: 2023-01-23

## 2023-02-27 RX ORDER — ERGOCALCIFEROL 1.25 MG/1
CAPSULE ORAL
COMMUNITY
Start: 2023-02-09

## 2023-02-27 RX ORDER — HYDROCODONE BITARTRATE AND ACETAMINOPHEN 10; 325 MG/1; MG/1
TABLET ORAL
COMMUNITY
Start: 2023-01-19

## 2023-02-27 RX ORDER — NICOTINE 21 MG/24HR
PATCH, TRANSDERMAL 24 HOURS TRANSDERMAL
COMMUNITY
Start: 2023-01-07

## 2023-02-27 RX ORDER — CHOLECALCIFEROL (VITAMIN D3) 125 MCG
CAPSULE ORAL
COMMUNITY

## 2023-02-27 RX ORDER — FAMOTIDINE 20 MG/1
TABLET, FILM COATED ORAL
COMMUNITY
Start: 2023-02-01

## 2023-02-27 RX ORDER — ARIPIPRAZOLE 2 MG/1
TABLET ORAL
COMMUNITY
Start: 2023-02-20

## 2023-02-27 RX ORDER — PANTOPRAZOLE SODIUM 40 MG/1
TABLET, DELAYED RELEASE ORAL
COMMUNITY
Start: 2023-02-09

## 2023-02-27 RX ORDER — PRAVASTATIN SODIUM 80 MG/1
80 TABLET ORAL
COMMUNITY

## 2023-02-27 RX ORDER — DOCUSATE SODIUM 100 MG/1
CAPSULE, LIQUID FILLED ORAL
COMMUNITY
Start: 2023-02-06

## 2023-02-27 RX ORDER — SUCRALFATE 1 G/1
TABLET ORAL
COMMUNITY
Start: 2023-02-10

## 2023-02-27 RX ORDER — CITALOPRAM 20 MG/1
TABLET ORAL
COMMUNITY
Start: 2023-02-06

## 2023-02-27 NOTE — PROGRESS NOTES
New Patient: LUMBAR SPINE    Referring Provider:  No ref. provider found    CHIEF COMPLAINT:    Chief Complaint   Patient presents with    Lower Back Pain     MRI RESULTS       HISTORY OF PRESENT ILLNESS:    Mr. Gonzalo Lua returns for the evaluation of low back and right leg pain. .  The symptoms began insidiously about 20 years ago and have increased substantially over the last 5 weeks. Xi Denis He rates his low back and right leg pain 7/10. Notes numbness tingling weakness in L5 distribution on the right he denies saddle anesthesia and bowel or bladder abnormality.     Current/Past Treatment:   Physical Therapy: Yes  Chiropractic: Yes  Injection: Yes  Medications: Gabapentin and Percocet    Past Medical History:   Past Medical History:   Diagnosis Date    Gastroparesis       Past Surgical History:     Past Surgical History:   Procedure Laterality Date    GALLBLADDER SURGERY       Current Medications:     Current Outpatient Medications:     albuterol (PROVENTIL) (2.5 MG/3ML) 0.083% nebulizer solution, , Disp: , Rfl:     nicotine (NICODERM CQ) 21 MG/24HR, , Disp: , Rfl:     nitroGLYCERIN (NITROSTAT) 0.4 MG SL tablet, , Disp: , Rfl:     ARIPiprazole (ABILIFY) 2 MG tablet, , Disp: , Rfl:     Cholecalciferol (VITAMIN D) 125 MCG (5000 UT) CAPS, , Disp: , Rfl:     citalopram (CELEXA) 20 MG tablet, , Disp: , Rfl:     Continuous Blood Gluc Sensor (DEXCOM G6 SENSOR) MISC, , Disp: , Rfl:     Continuous Blood Gluc Transmit (DEXCOM G6 TRANSMITTER) MISC, , Disp: , Rfl:     cyclobenzaprine (FLEXERIL) 10 MG tablet, , Disp: , Rfl:     docusate sodium (COLACE) 100 MG capsule, , Disp: , Rfl:     vitamin D (ERGOCALCIFEROL) 1.25 MG (79668 UT) CAPS capsule, , Disp: , Rfl:     famotidine (PEPCID) 20 MG tablet, Take 1 tablet by mouth at bedtime, Disp: , Rfl:     glipiZIDE (GLUCOTROL XL) 10 MG extended release tablet, , Disp: , Rfl:     HYDROcodone-acetaminophen (NORCO)  MG per tablet, TAKE ONE TABLET BY MOUTH EVERY 6 HOURS AS NEEDED, Disp: , Rfl:     GLOBAL EASE INJECT PEN NEEDLES 29G X 12MM MISC, , Disp: , Rfl:     metoclopramide (REGLAN) 5 MG/5ML solution, take 10ml BY MOUTH FOUR TIMES DAILY, Disp: , Rfl:     pantoprazole (PROTONIX) 40 MG tablet, TAKE ONE TABLET BY MOUTH DAILY, Disp: , Rfl:     pravastatin (PRAVACHOL) 80 MG tablet, 80 mg, Disp: , Rfl:     sucralfate (CARAFATE) 1 GM tablet, , Disp: , Rfl:     traZODone (DESYREL) 100 MG tablet, , Disp: , Rfl:     TRULICITY 1.5 CS/0.4ZF SOPN, , Disp: , Rfl:     LANTUS SOLOSTAR 100 UNIT/ML injection pen, , Disp: , Rfl:     gabapentin (NEURONTIN) 100 MG capsule, , Disp: , Rfl:     sertraline (ZOLOFT) 100 MG tablet, , Disp: , Rfl:     verapamil (CALAN SR) 240 MG extended release tablet, , Disp: , Rfl:     sildenafil (VIAGRA) 100 MG tablet, , Disp: , Rfl:     dicyclomine (BENTYL) 10 MG capsule, Take 1 capsule by mouth 4 times daily for 10 days, Disp: 40 capsule, Rfl: 0    ondansetron (ZOFRAN ODT) 4 MG disintegrating tablet, Take 1 tablet by mouth every 8 hours as needed for Nausea or Vomiting, Disp: 21 tablet, Rfl: 0  Allergies:  Latex  Social History:    reports that he has been smoking cigarettes. He has been smoking an average of 2 packs per day. He has never used smokeless tobacco. He reports that he does not currently use alcohol. He reports that he does not currently use drugs. Family History:   No family history on file.     REVIEW OF SYSTEMS: Full ROS noted & scanned   CONSTITUTIONAL: Denies unexplained weight loss, fevers, chills or fatigue  NEUROLOGICAL: Denies unsteady gait or progressive weakness  MUSCULOSKELETAL: Denies joint swelling or redness  PSYCHOLOGICAL: Denies anxiety, depression   SKIN: Denies skin changes, delayed healing, rash, itching   HEMATOLOGIC: Denies easy bleeding or bruising  ENDOCRINE: Denies excessive thirst, urination, heat/cold  RESPIRATORY: Denies current dyspnea, cough  GI: Denies nausea, vomiting, diarrhea   : Denies bowel or bladder issues      PHYSICAL EXAM:    Vitals: Height 5' 5\" (1.651 m), weight 182 lb (82.6 kg).    GENERAL EXAM:  General Apparence: Patient is adequately groomed with no evidence of malnutrition.  Orientation: The patient is oriented to time, place and person.   Mood & Affect:The patient's mood and affect are appropriate.  Vascular: Examination reveals no swelling tenderness in upper or lower extremities. Good capillary refill.  Lymphatic: The lymphatic examination bilaterally reveals all areas to be without enlargement or induration  Sensation: Sensation is intact without deficit  Coordination/Balance: Good coordination     LUMBAR/SACRAL EXAMINATION:  Inspection: Local inspection shows no step-off or bruising.  Lumbar alignment is normal.  Sagittal and Coronal balance is neutral.      Palpation:   No evidence of tenderness at the midline.  No tenderness bilaterally at the paraspinal or trochanters.  There is no step-off or paraspinal spasm.   Range of Motion: Lumbar flexion, extension and rotation are mildly limited due to pain.  Strength:   Strength testing is 5/5 in all muscle groups tested.   Special Tests:   Straight leg raise and crossed SLR negative.  Leg length and pelvis level.     Skin: There are no rashes, ulcerations or lesions.  Reflexes: Reflexes are symmetrically 2+ at the patellar and ankle tendons.  Clonus absent bilaterally at the feet.  Gait & station: normal, patient ambulates without assistance    Additional Examinations:   RIGHT LOWER EXTREMITY: Inspection/examination of the right lower extremity does not show any tenderness, deformity or injury. Range of motion is unremarkable. There is no gross instability.  There are no rashes, ulcerations or lesions. Strength and tone are normal.    LEFT LOWER EXTREMITY:  Inspection/examination of the left lower extremity does not show any tenderness, deformity or injury. Range of motion is unremarkable. There is no gross instability. There are no rashes, ulcerations or lesions.  Strength  and tone are normal.    Diagnostic Testing:    I reviewed CT images of his lumbar spine from 1/19/2023 in the office today. Those show an ossified disc protrusion right L4-L5    I reviewed CT images of his pelvis from 1/19/2023 in the office today. Those  are essentially normal    I reviewed MRI images of his lumbar spine from 2/20/2023 in the office today. Those show a central disc protrusion L for L5    Impression:   Right paracentral disc herniation L4-L5    Plan:    Discussed treatment options including observation, physical therapy, epidural injection, and microlumbar discectomy. We discussed the risks, benefits, and alternatives to surgery including the risks of nerve or vessel injury, paralysis, spinal blood clot, spinal fluid leak, death, infection, need for additional spinal surgery, failure of surgery to alleviate his symptoms and worse symptoms following surgery. He understands these risks and wishes to proceed with surgery. His questions were answered. Sully Ennis

## 2023-03-09 ENCOUNTER — TELEPHONE (OUTPATIENT)
Dept: ORTHOPEDIC SURGERY | Age: 53
End: 2023-03-09

## 2023-03-10 NOTE — PROGRESS NOTES
Mobile Hedger    Age 50 y.o.    male    1970    MRN [id number]    3/24/2023  Arrival Time_____________  OR Time____________[address]     Procedure(s): MICROLUMBAR DISCECTOMY LUMBAR 4 - LUMBAR 5           **LATEX SENSITIVE**                      General   Surgeon(s):  Ruth Shay, MD      DAY ADMIT ___  SDS/OP ___  OUTPT IN BED ___        Phone [phone] (home)     PCP _____________________ Phone_________________ Epic ( ) Epic CE ( ) Appt ________    ADDITIONAL INFO __________________________________ Cardio/Consult _____________    NOTES _____________________________________________________________________    ____________________________________________________________________________    PAT APPT DATE:________ TIME: ________  FAXED QAD: _______  (__) H&P w/ Hospitalist  __________________________________________________________________________  Preop Nurse phone screen complete: _____________  (__) CBC     (__) W/ DIFF ___________     (__) Hgb A1C    ___________  (__) CHEST X RAY   __________  (__) LIPID PROFILE  ___________  (__) EKG   __________  (__) PT/PTT   ___________  (__) PFT's   __________  (__) BMP   ___________  (__) CAROTIDS  __________  (__) CMP   ___________  (__) VEIN MAPPING  __________  (__) U/A   ___________  (__) HISTORY & PHYSICAL __________  (__) URINE C & S  ___________  (__) CARDIAC CLEARANCE __________  (__) U/A W/ FLEX  ___________  (__) PULM.  CLEARANCE __________  (__) SERUM PREGNANCY ___________  (__) Check Epic DOS orders __________  (__) TYPE & SCREEN __________repeat ( ) (__)  __________________ __________  (__) ALBUMIN   ___________  (__)  __________________ __________  (__) TRANSFERRIN  ___________  (__)  __________________ __________  (__) LIVER PROFILE  ___________  (__)  __________________ __________  (__) MRSA NASAL SWAB ___________  (__) URINE PREG DOS __________  (__) SED RATE  ___________  (__) BLOOD SUGAR DOS __________  (__) C-REACTIVE PROTEIN ___________    (__) VITAMIN D HYDROXY ___________  (__) BLOOD THINNERS __________    (__) ACE/ ARBS: _____________________     (__) BETABLOCKERS __________________

## 2023-03-15 NOTE — TELEPHONE ENCOUNTER
APPROVED  CPT: 20512  AUTH: [id number]  DATE RANGE: 3/24/23 TO 6/22/23  INSURANCE: TARAN  LOCATION AND  AREA OF SX LUMBAR  NOTE: DOC SCANNED

## 2023-03-16 NOTE — PROGRESS NOTES
Serge Manassas    Age 50 y.o.    male    1970    MRN [id number]    3/24/2023  Arrival Time_____________  OR Time____________[address] [facility] [place]     Procedure(s): MICROLUMBAR DISCECTOMY LUMBAR 4 - LUMBAR 5           **LATEX SENSITIVE**                      General   Surgeon(s):  April Ramirez, MD      DAY ADMIT ___  SDS/OP ___  OUTPT IN BED ___        Phone [phone] (home)     PCP _____________________ Phone_________________ Epic ( ) Epic CE ( ) Appt ________    ADDITIONAL INFO __________________________________ Cardio/Consult _____________    NOTES _____________________________________________________________________    ____________________________________________________________________________    PAT APPT DATE:________ TIME: ________  FAXED QAD: _______  (__) H&P w/ Hospitalist  __________________________________________________________________________  Preop Nurse phone screen complete: _____________  (__) CBC     (__) W/ DIFF ___________     (__) Hgb A1C    ___________  (__) CHEST X RAY   __________  (__) LIPID PROFILE  ___________  (__) EKG   __________  (__) PT/PTT   ___________  (__) PFT's   __________  (__) BMP   ___________  (__) CAROTIDS  __________  (__) CMP   ___________  (__) VEIN MAPPING  __________  (__) U/A   ___________  (__) HISTORY & PHYSICAL __________  (__) URINE C & S  ___________  (__) CARDIAC CLEARANCE __________  (__) U/A W/ FLEX  ___________  (__) PULM.  CLEARANCE __________  (__) SERUM PREGNANCY ___________  (__) Check Epic DOS orders __________  (__) TYPE & SCREEN __________repeat ( ) (__)  __________________ __________  (__) ALBUMIN   ___________  (__)  __________________ __________  (__) TRANSFERRIN  ___________  (__)  __________________ __________  (__) LIVER PROFILE  ___________  (__)  __________________ __________  (__) MRSA NASAL SWAB ___________  (__) URINE PREG DOS __________  (__) SED RATE  ___________  (__) BLOOD SUGAR DOS __________  (__) C-REACTIVE PROTEIN ___________    (__) VITAMIN D HYDROXY ___________  (__) BLOOD THINNERS __________    (__) ACE/ ARBS: _____________________     (__) BETABLOCKERS __________________

## 2023-03-23 ENCOUNTER — TELEPHONE (OUTPATIENT)
Dept: ORTHOPEDIC SURGERY | Age: 53
End: 2023-03-23

## 2023-03-23 NOTE — TELEPHONE ENCOUNTER
----- Message from Dianna Deal MD sent at 3/23/2023  3:29 PM EDT -----  Honey Blade      ----- Message -----  From: Alley Tristan MA  Sent: 3/23/2023   2:08 PM EDT  To: Dianna Deal MD    His PCP called about his pre-op blood work, scanned in media, his WBC is 13.1, in 2022 it was 12.5 he always shows slight elevation.

## 2023-03-24 ENCOUNTER — APPOINTMENT (OUTPATIENT)
Dept: GENERAL RADIOLOGY | Age: 53
End: 2023-03-24
Attending: ORTHOPAEDIC SURGERY
Payer: MEDICAID

## 2023-03-24 ENCOUNTER — ANESTHESIA EVENT (OUTPATIENT)
Dept: OPERATING ROOM | Age: 53
End: 2023-03-24
Payer: MEDICAID

## 2023-03-24 ENCOUNTER — HOSPITAL ENCOUNTER (OUTPATIENT)
Age: 53
Setting detail: OUTPATIENT SURGERY
Discharge: HOME | End: 2023-03-24
Attending: ORTHOPAEDIC SURGERY | Admitting: ORTHOPAEDIC SURGERY
Payer: MEDICAID

## 2023-03-24 ENCOUNTER — ANESTHESIA (OUTPATIENT)
Dept: OPERATING ROOM | Age: 53
End: 2023-03-24
Payer: MEDICAID

## 2023-03-24 VITALS
RESPIRATION RATE: 9 BRPM | TEMPERATURE: 97.5 F | DIASTOLIC BLOOD PRESSURE: 58 MMHG | OXYGEN SATURATION: 92 % | BODY MASS INDEX: 27.97 KG/M2 | HEIGHT: 66 IN | HEART RATE: 77 BPM | SYSTOLIC BLOOD PRESSURE: 106 MMHG | WEIGHT: 174 LBS

## 2023-03-24 DIAGNOSIS — M51.16 LUMBAR DISC HERNIATION WITH RADICULOPATHY: Primary | ICD-10-CM

## 2023-03-24 LAB
GLUCOSE BLD-MCNC: 186 MG/DL (ref 70–99)
PERFORMED ON: ABNORMAL

## 2023-03-24 PROCEDURE — 7100000001 HC PACU RECOVERY - ADDTL 15 MIN: Performed by: ORTHOPAEDIC SURGERY

## 2023-03-24 PROCEDURE — 3700000001 HC ADD 15 MINUTES (ANESTHESIA): Performed by: ORTHOPAEDIC SURGERY

## 2023-03-24 PROCEDURE — 2709999900 HC NON-CHARGEABLE SUPPLY: Performed by: ORTHOPAEDIC SURGERY

## 2023-03-24 PROCEDURE — 3209999900 FLUORO FOR SURGICAL PROCEDURES

## 2023-03-24 PROCEDURE — 6360000002 HC RX W HCPCS: Performed by: ORTHOPAEDIC SURGERY

## 2023-03-24 PROCEDURE — 7100000011 HC PHASE II RECOVERY - ADDTL 15 MIN: Performed by: ORTHOPAEDIC SURGERY

## 2023-03-24 PROCEDURE — 2580000003 HC RX 258

## 2023-03-24 PROCEDURE — 72100 X-RAY EXAM L-S SPINE 2/3 VWS: CPT

## 2023-03-24 PROCEDURE — 6360000002 HC RX W HCPCS

## 2023-03-24 PROCEDURE — 3600000015 HC SURGERY LEVEL 5 ADDTL 15MIN: Performed by: ORTHOPAEDIC SURGERY

## 2023-03-24 PROCEDURE — 7100000010 HC PHASE II RECOVERY - FIRST 15 MIN: Performed by: ORTHOPAEDIC SURGERY

## 2023-03-24 PROCEDURE — 2500000003 HC RX 250 WO HCPCS: Performed by: ORTHOPAEDIC SURGERY

## 2023-03-24 PROCEDURE — A4217 STERILE WATER/SALINE, 500 ML: HCPCS | Performed by: ORTHOPAEDIC SURGERY

## 2023-03-24 PROCEDURE — 2580000003 HC RX 258: Performed by: ANESTHESIOLOGY

## 2023-03-24 PROCEDURE — 3700000000 HC ANESTHESIA ATTENDED CARE: Performed by: ORTHOPAEDIC SURGERY

## 2023-03-24 PROCEDURE — 6360000002 HC RX W HCPCS: Performed by: ANESTHESIOLOGY

## 2023-03-24 PROCEDURE — 2500000003 HC RX 250 WO HCPCS

## 2023-03-24 PROCEDURE — 3600000005 HC SURGERY LEVEL 5 BASE: Performed by: ORTHOPAEDIC SURGERY

## 2023-03-24 PROCEDURE — 2580000003 HC RX 258: Performed by: ORTHOPAEDIC SURGERY

## 2023-03-24 PROCEDURE — 7100000000 HC PACU RECOVERY - FIRST 15 MIN: Performed by: ORTHOPAEDIC SURGERY

## 2023-03-24 PROCEDURE — 6370000000 HC RX 637 (ALT 250 FOR IP): Performed by: ANESTHESIOLOGY

## 2023-03-24 RX ORDER — PROPOFOL 10 MG/ML
INJECTION, EMULSION INTRAVENOUS PRN
Status: DISCONTINUED | OUTPATIENT
Start: 2023-03-24 | End: 2023-03-24 | Stop reason: SDUPTHER

## 2023-03-24 RX ORDER — SODIUM CHLORIDE 9 MG/ML
25 INJECTION, SOLUTION INTRAVENOUS PRN
Status: DISCONTINUED | OUTPATIENT
Start: 2023-03-24 | End: 2023-03-24 | Stop reason: HOSPADM

## 2023-03-24 RX ORDER — SODIUM CHLORIDE, SODIUM LACTATE, POTASSIUM CHLORIDE, CALCIUM CHLORIDE 600; 310; 30; 20 MG/100ML; MG/100ML; MG/100ML; MG/100ML
INJECTION, SOLUTION INTRAVENOUS CONTINUOUS
Status: DISCONTINUED | OUTPATIENT
Start: 2023-03-24 | End: 2023-03-24 | Stop reason: HOSPADM

## 2023-03-24 RX ORDER — SODIUM CHLORIDE 0.9 % (FLUSH) 0.9 %
5-40 SYRINGE (ML) INJECTION EVERY 12 HOURS SCHEDULED
Status: DISCONTINUED | OUTPATIENT
Start: 2023-03-24 | End: 2023-03-24 | Stop reason: HOSPADM

## 2023-03-24 RX ORDER — EPHEDRINE SULFATE 50 MG/ML
INJECTION INTRAVENOUS PRN
Status: DISCONTINUED | OUTPATIENT
Start: 2023-03-24 | End: 2023-03-24 | Stop reason: SDUPTHER

## 2023-03-24 RX ORDER — LIDOCAINE HYDROCHLORIDE 10 MG/ML
0.3 INJECTION, SOLUTION EPIDURAL; INFILTRATION; INTRACAUDAL; PERINEURAL
Status: DISCONTINUED | OUTPATIENT
Start: 2023-03-24 | End: 2023-03-24 | Stop reason: HOSPADM

## 2023-03-24 RX ORDER — MEPERIDINE HYDROCHLORIDE 50 MG/ML
12.5 INJECTION INTRAMUSCULAR; INTRAVENOUS; SUBCUTANEOUS EVERY 5 MIN PRN
Status: DISCONTINUED | OUTPATIENT
Start: 2023-03-24 | End: 2023-03-24 | Stop reason: HOSPADM

## 2023-03-24 RX ORDER — ONDANSETRON 2 MG/ML
4 INJECTION INTRAMUSCULAR; INTRAVENOUS EVERY 30 MIN PRN
Status: DISCONTINUED | OUTPATIENT
Start: 2023-03-24 | End: 2023-03-24 | Stop reason: HOSPADM

## 2023-03-24 RX ORDER — OXYCODONE HYDROCHLORIDE 5 MG/1
5 TABLET ORAL PRN
Status: DISCONTINUED | OUTPATIENT
Start: 2023-03-24 | End: 2023-03-24 | Stop reason: HOSPADM

## 2023-03-24 RX ORDER — OXYCODONE HYDROCHLORIDE AND ACETAMINOPHEN 5; 325 MG/1; MG/1
2 TABLET ORAL EVERY 4 HOURS PRN
Qty: 35 TABLET | Refills: 0 | Status: SHIPPED | OUTPATIENT
Start: 2023-03-24 | End: 2023-05-01

## 2023-03-24 RX ORDER — SODIUM CHLORIDE 9 MG/ML
INJECTION, SOLUTION INTRAVENOUS PRN
Status: DISCONTINUED | OUTPATIENT
Start: 2023-03-24 | End: 2023-03-24 | Stop reason: HOSPADM

## 2023-03-24 RX ORDER — ROCURONIUM BROMIDE 10 MG/ML
INJECTION, SOLUTION INTRAVENOUS PRN
Status: DISCONTINUED | OUTPATIENT
Start: 2023-03-24 | End: 2023-03-24 | Stop reason: SDUPTHER

## 2023-03-24 RX ORDER — OXYCODONE HYDROCHLORIDE 5 MG/1
10 TABLET ORAL PRN
Status: DISCONTINUED | OUTPATIENT
Start: 2023-03-24 | End: 2023-03-24 | Stop reason: HOSPADM

## 2023-03-24 RX ORDER — DIPHENHYDRAMINE HYDROCHLORIDE 50 MG/ML
6.25 INJECTION INTRAMUSCULAR; INTRAVENOUS
Status: COMPLETED | OUTPATIENT
Start: 2023-03-24 | End: 2023-03-24

## 2023-03-24 RX ORDER — LIDOCAINE HYDROCHLORIDE 20 MG/ML
INJECTION, SOLUTION EPIDURAL; INFILTRATION; INTRACAUDAL; PERINEURAL PRN
Status: DISCONTINUED | OUTPATIENT
Start: 2023-03-24 | End: 2023-03-24 | Stop reason: SDUPTHER

## 2023-03-24 RX ORDER — SODIUM CHLORIDE 0.9 % (FLUSH) 0.9 %
5-40 SYRINGE (ML) INJECTION PRN
Status: DISCONTINUED | OUTPATIENT
Start: 2023-03-24 | End: 2023-03-24 | Stop reason: HOSPADM

## 2023-03-24 RX ORDER — DEXAMETHASONE SODIUM PHOSPHATE 4 MG/ML
INJECTION, SOLUTION INTRA-ARTICULAR; INTRALESIONAL; INTRAMUSCULAR; INTRAVENOUS; SOFT TISSUE PRN
Status: DISCONTINUED | OUTPATIENT
Start: 2023-03-24 | End: 2023-03-24 | Stop reason: SDUPTHER

## 2023-03-24 RX ORDER — CEFAZOLIN SODIUM IN 0.9 % NACL 2 G/100 ML
2000 PLASTIC BAG, INJECTION (ML) INTRAVENOUS
Status: COMPLETED | OUTPATIENT
Start: 2023-03-24 | End: 2023-03-24

## 2023-03-24 RX ORDER — HYDROMORPHONE HCL 110MG/55ML
PATIENT CONTROLLED ANALGESIA SYRINGE INTRAVENOUS PRN
Status: DISCONTINUED | OUTPATIENT
Start: 2023-03-24 | End: 2023-03-24 | Stop reason: SDUPTHER

## 2023-03-24 RX ORDER — BUPIVACAINE HYDROCHLORIDE AND EPINEPHRINE 2.5; 5 MG/ML; UG/ML
INJECTION, SOLUTION EPIDURAL; INFILTRATION; INTRACAUDAL; PERINEURAL PRN
Status: DISCONTINUED | OUTPATIENT
Start: 2023-03-24 | End: 2023-03-24 | Stop reason: ALTCHOICE

## 2023-03-24 RX ORDER — ONDANSETRON 2 MG/ML
INJECTION INTRAMUSCULAR; INTRAVENOUS PRN
Status: DISCONTINUED | OUTPATIENT
Start: 2023-03-24 | End: 2023-03-24 | Stop reason: SDUPTHER

## 2023-03-24 RX ORDER — DOCUSATE SODIUM 100 MG/1
100 CAPSULE, LIQUID FILLED ORAL 2 TIMES DAILY
Qty: 30 CAPSULE | Refills: 0 | Status: SHIPPED | OUTPATIENT
Start: 2023-03-24 | End: 2023-04-08

## 2023-03-24 RX ORDER — SODIUM CHLORIDE, SODIUM LACTATE, POTASSIUM CHLORIDE, CALCIUM CHLORIDE 600; 310; 30; 20 MG/100ML; MG/100ML; MG/100ML; MG/100ML
INJECTION, SOLUTION INTRAVENOUS CONTINUOUS PRN
Status: DISCONTINUED | OUTPATIENT
Start: 2023-03-24 | End: 2023-03-24 | Stop reason: SDUPTHER

## 2023-03-24 RX ORDER — MIDAZOLAM HYDROCHLORIDE 1 MG/ML
2 INJECTION INTRAMUSCULAR; INTRAVENOUS
Status: DISCONTINUED | OUTPATIENT
Start: 2023-03-24 | End: 2023-03-24 | Stop reason: HOSPADM

## 2023-03-24 RX ADMIN — SODIUM CHLORIDE, SODIUM LACTATE, POTASSIUM CHLORIDE, AND CALCIUM CHLORIDE: .6; .31; .03; .02 INJECTION, SOLUTION INTRAVENOUS at 14:12

## 2023-03-24 RX ADMIN — METHOCARBAMOL 1 G: 100 INJECTION INTRAMUSCULAR; INTRAVENOUS at 14:50

## 2023-03-24 RX ADMIN — EPHEDRINE SULFATE 10 MG: 50 INJECTION INTRAVENOUS at 15:05

## 2023-03-24 RX ADMIN — SODIUM CHLORIDE, SODIUM LACTATE, POTASSIUM CHLORIDE, AND CALCIUM CHLORIDE: .6; .31; .03; .02 INJECTION, SOLUTION INTRAVENOUS at 14:48

## 2023-03-24 RX ADMIN — EPHEDRINE SULFATE 5 MG: 50 INJECTION INTRAVENOUS at 14:41

## 2023-03-24 RX ADMIN — HYDROMORPHONE HYDROCHLORIDE 0.5 MG: 0.5 INJECTION, SOLUTION INTRAMUSCULAR; INTRAVENOUS; SUBCUTANEOUS at 15:47

## 2023-03-24 RX ADMIN — SUGAMMADEX 200 MG: 100 INJECTION, SOLUTION INTRAVENOUS at 15:11

## 2023-03-24 RX ADMIN — HYDROMORPHONE HYDROCHLORIDE 0.5 MG: 0.5 INJECTION, SOLUTION INTRAMUSCULAR; INTRAVENOUS; SUBCUTANEOUS at 15:57

## 2023-03-24 RX ADMIN — HYDROMORPHONE HYDROCHLORIDE 0.5 MG: 0.5 INJECTION, SOLUTION INTRAMUSCULAR; INTRAVENOUS; SUBCUTANEOUS at 13:38

## 2023-03-24 RX ADMIN — DIPHENHYDRAMINE HYDROCHLORIDE 6.25 MG: 50 INJECTION, SOLUTION INTRAMUSCULAR; INTRAVENOUS at 16:25

## 2023-03-24 RX ADMIN — LIDOCAINE HYDROCHLORIDE 80 MG: 20 INJECTION, SOLUTION EPIDURAL; INFILTRATION; INTRACAUDAL; PERINEURAL at 14:17

## 2023-03-24 RX ADMIN — ONDANSETRON 4 MG: 2 INJECTION INTRAMUSCULAR; INTRAVENOUS at 15:48

## 2023-03-24 RX ADMIN — PROPOFOL 20 MG: 10 INJECTION, EMULSION INTRAVENOUS at 15:11

## 2023-03-24 RX ADMIN — DEXAMETHASONE SODIUM PHOSPHATE 8 MG: 4 INJECTION, SOLUTION INTRAMUSCULAR; INTRAVENOUS at 14:17

## 2023-03-24 RX ADMIN — PROPOFOL 200 MG: 10 INJECTION, EMULSION INTRAVENOUS at 14:17

## 2023-03-24 RX ADMIN — HYDROMORPHONE HYDROCHLORIDE 0.5 MG: 2 INJECTION, SOLUTION INTRAMUSCULAR; INTRAVENOUS; SUBCUTANEOUS at 15:26

## 2023-03-24 RX ADMIN — EPHEDRINE SULFATE 5 MG: 50 INJECTION INTRAVENOUS at 14:30

## 2023-03-24 RX ADMIN — Medication 2000 MG: at 14:27

## 2023-03-24 RX ADMIN — ONDANSETRON 4 MG: 2 INJECTION INTRAMUSCULAR; INTRAVENOUS at 14:12

## 2023-03-24 RX ADMIN — HYDROMORPHONE HYDROCHLORIDE 0.5 MG: 2 INJECTION, SOLUTION INTRAMUSCULAR; INTRAVENOUS; SUBCUTANEOUS at 15:16

## 2023-03-24 RX ADMIN — SODIUM CHLORIDE, POTASSIUM CHLORIDE, SODIUM LACTATE AND CALCIUM CHLORIDE: 600; 310; 30; 20 INJECTION, SOLUTION INTRAVENOUS at 13:40

## 2023-03-24 RX ADMIN — ROCURONIUM BROMIDE 50 MG: 10 SOLUTION INTRAVENOUS at 14:17

## 2023-03-24 RX ADMIN — OXYCODONE 10 MG: 5 TABLET ORAL at 16:39

## 2023-03-24 ASSESSMENT — PAIN DESCRIPTION - LOCATION
LOCATION: BACK
LOCATION: LEG;BACK
LOCATION: BACK

## 2023-03-24 ASSESSMENT — PAIN SCALES - GENERAL
PAINLEVEL_OUTOF10: 9
PAINLEVEL_OUTOF10: 7

## 2023-03-24 ASSESSMENT — LIFESTYLE VARIABLES: SMOKING_STATUS: 1

## 2023-03-24 ASSESSMENT — PAIN - FUNCTIONAL ASSESSMENT: PAIN_FUNCTIONAL_ASSESSMENT: PREVENTS OR INTERFERES WITH ALL ACTIVE AND SOME PASSIVE ACTIVITIES

## 2023-03-24 ASSESSMENT — PAIN DESCRIPTION - DESCRIPTORS: DESCRIPTORS: SHARP;SORE;STABBING

## 2023-03-24 ASSESSMENT — PAIN DESCRIPTION - ORIENTATION: ORIENTATION: RIGHT

## 2023-03-24 NOTE — DISCHARGE INSTR - DIET
Good nutrition is important when healing from an illness, injury, or surgery. Follow any nutrition recommendations given to you during your hospital stay. If you were given an oral nutrition supplement while in the hospital, continue to take this supplement at home. You can take it with meals, in-between meals, and/or before bedtime. These supplements can be purchased at most local grocery stores, pharmacies, and chain [employer]-stores. If you have any questions about your diet or nutrition, call the hospital and ask for the dietitian.   Advance to your regular diet as tolerated

## 2023-03-24 NOTE — PROGRESS NOTES
Patient met discharge criteria for phase1. Discharge instructions reviewed with patient/responsible adult and understanding verbalized. Discharge instructions signed and copies given. Discharge criteria met for phase2. Patient discharged home with belongings.

## 2023-03-24 NOTE — DISCHARGE INSTRUCTIONS
meals a day to assist your body to heal well. Constipation is common after surgery and especially while on pain medications. Apple or prune juice may help with this problem. Over the counter stool softeners are often helpful. You may also try any over the counter laxative you feel may be helpful. Drinking more water is another way to keep your bowels moving. Braces: You may have been given a back brace at the hospital.  If you have received one,  then you should be wearing it at all times when in an upright position. It can be removed for bathing or sleep. Medications: You can resume your usual home medicines after you are discharged from the hospital.  Additionally, pain medications or anti-inflammatory medications may have been prescribed. Please take the anti-inflammatory medication even if you do not take the pain medication. The anti-inflammatory and pain medications may be taken together. Please do not take the anti-inflammatory medication if you have a history of ulcers or gastritis. Please stop the anti-inflammatory medication if it upsets your stomach or you notice blood in you stools or black stools. These medicines have been given to you to assist in relief of pain related to your surgery. It will be helpful to you to keep your pain under control in order to assist you in completing the recommended daily walks. Returning to work:  Some patients can return to a sedentary job in 1-3 days. Heavy  lifting jobs require more time off of work. Please discuss this with the doctor on your first postoperative office visit. Healing may take up to 3 or more  months after a major spine surgery. Diabetics:  Sometimes blood sugars are elevated after a surgery and may take up to a week to return to your usual levels. As well, if you have been prescribed a Medrol Steroid Pack, your sugars are likely to become somewhat elevated for a short period of time.   Please monitor your blood sugars daily and call your primary doctor if the levels become higher than 200. Follow-up Appointment:  You should contact the office for an appointment with the doctor or his assistant 2-3 weeks after surgery. Dr. Dev Jarrell.  Lorena       [phone]

## 2023-03-24 NOTE — OP NOTE
Operative Note      Patient: Tiera An  YOB: 1970  MRN: [id number]    Date of Procedure: 3/24/2023    Pre-Op Diagnosis: Lumbar disc herniation with radiculopathy    Post-Op Diagnosis: Same       Procedure(s): MICROLUMBAR DISCECTOMY LUMBAR 4 - LUMBAR 5           **LATEX SENSITIVE**    Surgeon(s):  Amanda Whelan MD    Assistant:   Surgical Assistant: Miley Harris    Anesthesia: General    Estimated Blood Loss (mL): less than 50     Complications: None    Specimens:   * No specimens in log *    Implants:  * No implants in log *      Drains: * No LDAs found *    Findings: hnp    Detailed Description of Procedure:       INDICATIONS FOR SURGERY:   Tiera An is a 50 y.o. male with back and right leg pain. The symptoms failed to respond to conservative intervention. An MRI scan was performed and this showed evidence of a disk herniation at the L4-5 level on the right. Having failed conservative management and experiencing persistent symptoms, the patient elected to proceed ahead with the surgical option of microdiskectomy at L4-5. DETAILS OF PROCEDURE:  The patient was brought to the operating room and placed under general anesthesia. The patient was then placed prone on a [place] table. All bony prominences were inspected and padded prior to sterile draping. Using a #10 blade knife, the skin was incised in the midline and monopolar cautery was used to dissect through the subcutaneous tissue to open the fascia and reflect the paraspinal muscles laterally exposing the L4-5 interspace on the right side. The microscope was then brought into the field and used to assist with performing a microsurgical diskectomy at this level. Using the [employer] Yuri drill, I burred down the hemilamina of L4 and a portion of the inferior L5 lamina.  The underlying ligamentum flavum was freed with the micronerve hook from the underlying dura and removed with the 3 mm punch laterally, exposing the lateral dural

## 2023-03-24 NOTE — LETTER
Billing and Coding Fee Ticket    Name:EDWIN RASCON  : 1970  Diagnosis: hnp L4-5  Surgeon: Angela Carrillo    DOS: 3/24/23    Procedure:  1.   Microlumbar laminotomy partial discectomy [id number]

## 2023-03-24 NOTE — ANESTHESIA POSTPROCEDURE EVALUATION
Department of Anesthesiology  Postprocedure Note    Patient: Malia Parks  MRN: [id number]  YOB: 1970  Date of evaluation: 3/24/2023      Procedure Summary     Date: 03/24/23 Room / Location: [facility]    Anesthesia Start: 4518 Anesthesia Stop: 1681    Procedure: MICROLUMBAR DISCECTOMY LUMBAR 4 - LUMBAR 5           **LATEX SENSITIVE** (Spine Lumbar) Diagnosis:       Lumbar disc herniation with radiculopathy      (Lumbar disc herniation with radiculopathy)    Surgeons: Cheyenne Bucio MD Responsible Provider: Reji Chen MD    Anesthesia Type: general ASA Status: 3          Anesthesia Type: No value filed.     Rhea Phase I: Rhea Score: 10    Rhea Phase II: Rhea Score: 10      Anesthesia Post Evaluation    Patient location during evaluation: PACU  Patient participation: complete - patient participated  Level of consciousness: awake and alert  Pain score: 0  Airway patency: patent  Nausea & Vomiting: no nausea and no vomiting  Complications: no  Cardiovascular status: hemodynamically stable  Respiratory status: acceptable  Hydration status: stable

## 2023-03-24 NOTE — PROGRESS NOTES
Arrived to \[facility]\"", IV started, labs obtained, consents verified. NPO @ 8am per patient. Personal belongings to PACU.

## 2023-03-24 NOTE — H&P
I have reviewed the history and physical and examined the patient and find no relevant changes. I have reviewed with the patient and/or family the risks, benefits, and alternatives to the procedure. The patient was counseled at length about the risks of jeffery Covid-19 during their perioperative period and any recovery window from their procedure. The patient was made aware that jeffery Covid-19  may worsen their prognosis for recovering from their procedure  and lend to a higher morbidity and/or mortality risk. All material risks, benefits, and reasonable alternatives including postponing the procedure were discussed. The patient does wish to proceed with the procedure at this time. Benjie Maloney MD  3/24/2023 No intake/output data recorded.

## 2023-03-24 NOTE — ANESTHESIA PRE PROCEDURE
Department of Anesthesiology  Preprocedure Note       Name:  Erlinda Hua   Age:  50 y.o.  :  1970                                          MRN:  [id number]         Date:  3/24/2023      Surgeon: Alexander Gray):  Amy Beyer MD    Procedure: Procedure(s): MICROLUMBAR DISCECTOMY LUMBAR 4 - LUMBAR 5           **LATEX SENSITIVE**    Medications prior to admission:   Prior to Admission medications    Medication Sig Start Date End Date Taking? Authorizing Provider   oxyCODONE-acetaminophen (PERCOCET) 5-325 MG per tablet Take 2 tablets by mouth every 4 hours as needed for Pain for up to 35 doses.  May take 1 tab every 4 hours prn moderate pain and 2 tabs every 4 hours prn severe pain Max Daily Amount: 12 tablets 3/24/23 5/1/23 Yes Amy Beyer MD   docusate sodium (COLACE) 100 MG capsule Take 1 capsule by mouth 2 times daily for 30 doses 3/24/23 4/8/23 Yes Amy Beyer MD   Multiple Vitamin (MULTI VITAMIN MENS PO) Take 1 tablet by mouth Daily   Yes Historical Provider, MD   albuterol (PROVENTIL) (2.5 MG/3ML) 0.083% nebulizer solution Take 2.5 mg by nebulization every 4 hours as needed 23   Historical Provider, MD   ARIPiprazole (ABILIFY) 2 MG tablet Take 2 mg by mouth daily 23   Historical Provider, MD   citalopram (CELEXA) 20 MG tablet Take 20 mg by mouth daily 23   Historical Provider, MD   Continuous Blood Gluc Sensor (DEXCOM G6 SENSOR) [address]  23   Historical Provider, MD   Continuous Blood Gluc Transmit (DEXCOM G6 TRANSMITTER) [address] [facility]  23   Historical Provider, MD   cyclobenzaprine (FLEXERIL) 10 MG tablet Take 10 mg by mouth 3 times daily as needed 23   Historical Provider, MD   docusate sodium (COLACE) 100 MG capsule Take 100 mg by mouth Daily 23   Historical Provider, MD   vitamin D (ERGOCALCIFEROL) 1.25 MG (34718 UT) CAPS capsule Take 50,000 Units by mouth once a week 23   Historical Provider, MD   famotidine (PEPCID) 20 MG tablet Take 20 mg by mouth nightly 23

## 2023-04-06 ENCOUNTER — OFFICE VISIT (OUTPATIENT)
Dept: ORTHOPEDIC SURGERY | Age: 53
End: 2023-04-06

## 2023-04-06 VITALS — WEIGHT: 174 LBS | HEIGHT: 66 IN | BODY MASS INDEX: 27.97 KG/M2

## 2023-04-06 DIAGNOSIS — M51.16 LUMBAR DISC HERNIATION WITH RADICULOPATHY: Primary | ICD-10-CM

## 2023-04-06 PROCEDURE — 99024 POSTOP FOLLOW-UP VISIT: CPT | Performed by: ORTHOPAEDIC SURGERY

## 2023-04-06 NOTE — PROGRESS NOTES
Mr. Harley Cox returns today 2 weeks s/p MLD right L4-5. He reports marked improvement of his leg symptoms. His incisions show no signs of infection. He has a normal gait and 5/5 strength of his ankle DFs/PFs, bilaterally. His sensation is intact from L3 to S1 bilaterally. I cautioned him to avoid lifting more than 10 pounds, bending and impact type aerobic exercise for 8 week after surgery, then slowly increase his activity over the next month.

## 2023-08-28 ENCOUNTER — HOSPITAL ENCOUNTER (OUTPATIENT)
Dept: VASCULAR LAB | Age: 53
Discharge: HOME | End: 2023-08-28
Payer: MEDICAID

## 2023-08-28 DIAGNOSIS — I65.23 BILATERAL CAROTID ARTERY STENOSIS: ICD-10-CM

## 2023-08-28 PROCEDURE — 93880 EXTRACRANIAL BILAT STUDY: CPT

## 2024-11-24 ENCOUNTER — APPOINTMENT (OUTPATIENT)
Dept: GENERAL RADIOLOGY | Age: 54
DRG: 720 | End: 2024-11-24
Payer: MEDICAID

## 2024-11-24 ENCOUNTER — APPOINTMENT (OUTPATIENT)
Dept: CT IMAGING | Age: 54
DRG: 720 | End: 2024-11-24
Payer: MEDICAID

## 2024-11-24 ENCOUNTER — HOSPITAL ENCOUNTER (INPATIENT)
Age: 54
LOS: 2 days | Discharge: HOME | DRG: 720 | End: 2024-11-26
Attending: STUDENT IN AN ORGANIZED HEALTH CARE EDUCATION/TRAINING PROGRAM | Admitting: STUDENT IN AN ORGANIZED HEALTH CARE EDUCATION/TRAINING PROGRAM
Payer: MEDICAID

## 2024-11-24 DIAGNOSIS — F40.240 CLAUSTROPHOBIA: ICD-10-CM

## 2024-11-24 DIAGNOSIS — R79.89 ELEVATED TROPONIN: ICD-10-CM

## 2024-11-24 DIAGNOSIS — K42.9 PERIUMBILICAL HERNIA: ICD-10-CM

## 2024-11-24 DIAGNOSIS — R91.1 LUNG NODULE: ICD-10-CM

## 2024-11-24 DIAGNOSIS — K59.00 CONSTIPATION, UNSPECIFIED CONSTIPATION TYPE: ICD-10-CM

## 2024-11-24 DIAGNOSIS — R29.818 TRANSIENT NEUROLOGICAL SYMPTOMS: ICD-10-CM

## 2024-11-24 DIAGNOSIS — R53.1 WEAKNESS: ICD-10-CM

## 2024-11-24 DIAGNOSIS — T79.6XXA TRAUMATIC RHABDOMYOLYSIS, INITIAL ENCOUNTER (HCC): ICD-10-CM

## 2024-11-24 DIAGNOSIS — R79.89 ELEVATED BRAIN NATRIURETIC PEPTIDE (BNP) LEVEL: ICD-10-CM

## 2024-11-24 DIAGNOSIS — K40.90 INGUINAL HERNIA WITHOUT OBSTRUCTION OR GANGRENE, RECURRENCE NOT SPECIFIED, UNSPECIFIED LATERALITY: ICD-10-CM

## 2024-11-24 DIAGNOSIS — R65.20 SEVERE SEPSIS (HCC): ICD-10-CM

## 2024-11-24 DIAGNOSIS — J18.9 COMMUNITY ACQUIRED PNEUMONIA, UNSPECIFIED LATERALITY: ICD-10-CM
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
## 2024-11-24 DIAGNOSIS — A41.9 SEVERE SEPSIS (HCC): ICD-10-CM

## 2024-11-24 DIAGNOSIS — R55 SYNCOPE, UNSPECIFIED SYNCOPE TYPE: ICD-10-CM

## 2024-11-24 LAB
ALBUMIN SERPL-MCNC: 3.6 G/DL (ref 3.4–5)
ALBUMIN/GLOB SERPL: 1.4 {RATIO} (ref 1.1–2.2)
ALP SERPL-CCNC: 85 U/L (ref 40–129)
ALT SERPL-CCNC: 48 U/L (ref 10–40)
ANION GAP SERPL CALCULATED.3IONS-SCNC: 12 MMOL/L (ref 3–16)
AST SERPL-CCNC: 133 U/L (ref 15–37)
BASE EXCESS BLDV CALC-SCNC: 0.3 MMOL/L (ref -3–3)
BASOPHILS # BLD: 0 K/UL (ref 0–0.2)
BASOPHILS NFR BLD: 0 %
BILIRUB SERPL-MCNC: 0.4 MG/DL (ref 0–1)
BUN SERPL-MCNC: 28 MG/DL (ref 7–20)
CALCIUM SERPL-MCNC: 8.9 MG/DL (ref 8.3–10.6)
CHLORIDE SERPL-SCNC: 102 MMOL/L (ref 99–110)
CK SERPL-CCNC: 7051 U/L (ref 39–308)
CO2 BLDV-SCNC: 28 MMOL/L
CO2 SERPL-SCNC: 27 MMOL/L (ref 21–32)
COHGB MFR BLDV: 3.7 % (ref 0–1.5)
CREAT SERPL-MCNC: 0.9 MG/DL (ref 0.9–1.3)
D-DIMER QUANTITATIVE: 1.08 UG/ML FEU (ref 0–0.6)
DEPRECATED RDW RBC AUTO: 13.4 % (ref 12.4–15.4)
EKG ATRIAL RATE: 104 BPM
EKG DIAGNOSIS: NORMAL
EKG P AXIS: 61 DEGREES
EKG P-R INTERVAL: 156 MS
EKG Q-T INTERVAL: 336 MS
EKG QRS DURATION: 96 MS
EKG QTC CALCULATION (BAZETT): 441 MS
EKG R AXIS: 62 DEGREES
EKG T AXIS: 65 DEGREES
EKG VENTRICULAR RATE: 104 BPM
EOSINOPHIL # BLD: 0 K/UL (ref 0–0.6)
EOSINOPHIL NFR BLD: 0 %
GFR SERPLBLD CREATININE-BSD FMLA CKD-EPI: >90 ML/MIN/{1.73_M2}
GLUCOSE BLD-MCNC: 97 MG/DL (ref 70–99)
GLUCOSE SERPL-MCNC: 146 MG/DL (ref 70–99)
HCO3 BLDV-SCNC: 26.2 MMOL/L (ref 23–29)
HCT VFR BLD AUTO: 44.3 % (ref 40.5–52.5)
HGB BLD-MCNC: 14.7 G/DL (ref 13.5–17.5)
LACTATE BLDV-SCNC: 1.9 MMOL/L (ref 0.4–1.9)
LACTATE BLDV-SCNC: 2 MMOL/L (ref 0.4–1.9)
LYMPHOCYTES # BLD: 0.6 K/UL (ref 1–5.1)
LYMPHOCYTES NFR BLD: 2 %
MCH RBC QN AUTO: 31.7 PG (ref 26–34)
MCHC RBC AUTO-ENTMCNC: 33.2 G/DL (ref 31–36)
MCV RBC AUTO: 95.5 FL (ref 80–100)
METHGB MFR BLDV: 0.3 %
MONOCYTES # BLD: 0.9 K/UL (ref 0–1.3)
MONOCYTES NFR BLD: 3 %
NEUTROPHILS # BLD: 27 K/UL (ref 1.7–7.7)
NEUTROPHILS NFR BLD: 94 %
NEUTS BAND NFR BLD MANUAL: 1 % (ref 0–7)
NT-PROBNP SERPL-MCNC: 1451 PG/ML (ref 0–124)
O2 CT VFR BLDV CALC: 18 VOL %
O2 THERAPY: ABNORMAL
PCO2 BLDV: 47 MMHG (ref 40–50)
PERFORMED ON: NORMAL
PH BLDV: 7.36 [PH] (ref 7.35–7.45)
PLATELET # BLD AUTO: 230 K/UL (ref 135–450)
PLATELET BLD QL SMEAR: ADEQUATE
PMV BLD AUTO: 8.3 FL (ref 5–10.5)
PO2 BLDV: 48.1 MMHG (ref 25–40)
POTASSIUM SERPL-SCNC: 4.1 MMOL/L (ref 3.5–5.1)
PROT SERPL-MCNC: 6.2 G/DL (ref 6.4–8.2)
RBC # BLD AUTO: 4.63 M/UL (ref 4.2–5.9)
RBC MORPH BLD: NORMAL
SAO2 % BLDV: 82 %
SLIDE REVIEW: ABNORMAL
SODIUM SERPL-SCNC: 141 MMOL/L (ref 136–145)
TROPONIN, HIGH SENSITIVITY: 119 NG/L (ref 0–22)
TROPONIN, HIGH SENSITIVITY: 122 NG/L (ref 0–22)
WBC # BLD AUTO: 28.4 K/UL (ref 4–11)

## 2024-11-24 PROCEDURE — 6360000002 HC RX W HCPCS: Performed by: STUDENT IN AN ORGANIZED HEALTH CARE EDUCATION/TRAINING PROGRAM

## 2024-11-24 PROCEDURE — 80053 COMPREHEN METABOLIC PANEL: CPT

## 2024-11-24 PROCEDURE — 70486 CT MAXILLOFACIAL W/O DYE: CPT

## 2024-11-24 PROCEDURE — 96374 THER/PROPH/DIAG INJ IV PUSH: CPT

## 2024-11-24 PROCEDURE — 6370000000 HC RX 637 (ALT 250 FOR IP): Performed by: STUDENT IN AN ORGANIZED HEALTH CARE EDUCATION/TRAINING PROGRAM

## 2024-11-24 PROCEDURE — 83605 ASSAY OF LACTIC ACID: CPT

## 2024-11-24 PROCEDURE — 73560 X-RAY EXAM OF KNEE 1 OR 2: CPT

## 2024-11-24 PROCEDURE — 96375 TX/PRO/DX INJ NEW DRUG ADDON: CPT

## 2024-11-24 PROCEDURE — 85025 COMPLETE CBC W/AUTO DIFF WBC: CPT

## 2024-11-24 PROCEDURE — 82550 ASSAY OF CK (CPK): CPT

## 2024-11-24 PROCEDURE — 71046 X-RAY EXAM CHEST 2 VIEWS: CPT

## 2024-11-24 PROCEDURE — 93005 ELECTROCARDIOGRAM TRACING: CPT | Performed by: STUDENT IN AN ORGANIZED HEALTH CARE EDUCATION/TRAINING PROGRAM

## 2024-11-24 PROCEDURE — 99285 EMERGENCY DEPT VISIT HI MDM: CPT

## 2024-11-24 PROCEDURE — 2580000003 HC RX 258: Performed by: REGISTERED NURSE

## 2024-11-24 PROCEDURE — 96361 HYDRATE IV INFUSION ADD-ON: CPT

## 2024-11-24 PROCEDURE — 70450 CT HEAD/BRAIN W/O DYE: CPT

## 2024-11-24 PROCEDURE — 2580000003 HC RX 258: Performed by: STUDENT IN AN ORGANIZED HEALTH CARE EDUCATION/TRAINING PROGRAM

## 2024-11-24 PROCEDURE — 73130 X-RAY EXAM OF HAND: CPT

## 2024-11-24 PROCEDURE — 71260 CT THORAX DX C+: CPT

## 2024-11-24 PROCEDURE — 6360000002 HC RX W HCPCS: Performed by: REGISTERED NURSE

## 2024-11-24 PROCEDURE — 82803 BLOOD GASES ANY COMBINATION: CPT

## 2024-11-24 PROCEDURE — 74177 CT ABD & PELVIS W/CONTRAST: CPT

## 2024-11-24 PROCEDURE — 83880 ASSAY OF NATRIURETIC PEPTIDE: CPT

## 2024-11-24 PROCEDURE — 6370000000 HC RX 637 (ALT 250 FOR IP): Performed by: REGISTERED NURSE

## 2024-11-24 PROCEDURE — 73110 X-RAY EXAM OF WRIST: CPT

## 2024-11-24 PROCEDURE — 87040 BLOOD CULTURE FOR BACTERIA: CPT

## 2024-11-24 PROCEDURE — 84484 ASSAY OF TROPONIN QUANT: CPT

## 2024-11-24 PROCEDURE — 93010 ELECTROCARDIOGRAM REPORT: CPT | Performed by: INTERNAL MEDICINE

## 2024-11-24 PROCEDURE — 6360000004 HC RX CONTRAST MEDICATION: Performed by: REGISTERED NURSE

## 2024-11-24 PROCEDURE — 72125 CT NECK SPINE W/O DYE: CPT

## 2024-11-24 PROCEDURE — 1200000000 HC SEMI PRIVATE

## 2024-11-24 PROCEDURE — 85379 FIBRIN DEGRADATION QUANT: CPT

## 2024-11-24 RX ORDER — IPRATROPIUM BROMIDE AND ALBUTEROL SULFATE 2.5; .5 MG/3ML; MG/3ML
1 SOLUTION RESPIRATORY (INHALATION)
Status: DISCONTINUED | OUTPATIENT
Start: 2024-11-24 | End: 2024-11-25

## 2024-11-24 RX ORDER — INSULIN GLARGINE 100 [IU]/ML
30 INJECTION, SOLUTION SUBCUTANEOUS 2 TIMES DAILY
Status: DISCONTINUED | OUTPATIENT
Start: 2024-11-25 | End: 2024-11-26 | Stop reason: HOSPADM

## 2024-11-24 RX ORDER — SUCRALFATE 1 G/1
1 TABLET ORAL
Status: DISCONTINUED | OUTPATIENT
Start: 2024-11-25 | End: 2024-11-26 | Stop reason: HOSPADM

## 2024-11-24 RX ORDER — VERAPAMIL HYDROCHLORIDE 240 MG/1
240 TABLET, FILM COATED, EXTENDED RELEASE ORAL 2 TIMES DAILY
Status: DISCONTINUED | OUTPATIENT
Start: 2024-11-24 | End: 2024-11-25

## 2024-11-24 RX ORDER — PLECANATIDE 3 MG/1
3 TABLET ORAL DAILY
COMMUNITY

## 2024-11-24 RX ORDER — AZITHROMYCIN 250 MG/1
500 TABLET, FILM COATED ORAL EVERY 24 HOURS
Status: DISCONTINUED | OUTPATIENT
Start: 2024-11-24 | End: 2024-11-24

## 2024-11-24 RX ORDER — ONDANSETRON 4 MG/1
4 TABLET, ORALLY DISINTEGRATING ORAL EVERY 8 HOURS PRN
Status: DISCONTINUED | OUTPATIENT
Start: 2024-11-24 | End: 2024-11-24 | Stop reason: SDUPTHER

## 2024-11-24 RX ORDER — ACETAMINOPHEN 325 MG/1
650 TABLET ORAL EVERY 6 HOURS PRN
Status: DISCONTINUED | OUTPATIENT
Start: 2024-11-24 | End: 2024-11-26 | Stop reason: HOSPADM

## 2024-11-24 RX ORDER — CITALOPRAM HYDROBROMIDE 20 MG/1
20 TABLET ORAL DAILY
Status: DISCONTINUED | OUTPATIENT
Start: 2024-11-25 | End: 2024-11-24

## 2024-11-24 RX ORDER — MORPHINE SULFATE 4 MG/ML
4 INJECTION, SOLUTION INTRAMUSCULAR; INTRAVENOUS ONCE
Status: COMPLETED | OUTPATIENT
Start: 2024-11-24 | End: 2024-11-24

## 2024-11-24 RX ORDER — ONDANSETRON 4 MG/1
4 TABLET, ORALLY DISINTEGRATING ORAL EVERY 8 HOURS PRN
Status: DISCONTINUED | OUTPATIENT
Start: 2024-11-24 | End: 2024-11-26 | Stop reason: HOSPADM

## 2024-11-24 RX ORDER — GLIPIZIDE 5 MG/1
10 TABLET ORAL
Status: DISCONTINUED | OUTPATIENT
Start: 2024-11-25 | End: 2024-11-26 | Stop reason: HOSPADM

## 2024-11-24 RX ORDER — DEXTROSE MONOHYDRATE 100 MG/ML
INJECTION, SOLUTION INTRAVENOUS CONTINUOUS PRN
Status: DISCONTINUED | OUTPATIENT
Start: 2024-11-24 | End: 2024-11-26 | Stop reason: HOSPADM

## 2024-11-24 RX ORDER — NICOTINE 21 MG/24HR
1 PATCH, TRANSDERMAL 24 HOURS TRANSDERMAL ONCE
Status: COMPLETED | OUTPATIENT
Start: 2024-11-24 | End: 2024-11-25

## 2024-11-24 RX ORDER — ONDANSETRON 2 MG/ML
4 INJECTION INTRAMUSCULAR; INTRAVENOUS ONCE
Status: COMPLETED | OUTPATIENT
Start: 2024-11-24 | End: 2024-11-24

## 2024-11-24 RX ORDER — GLUCAGON 1 MG/ML
1 KIT INJECTION PRN
Status: DISCONTINUED | OUTPATIENT
Start: 2024-11-24 | End: 2024-11-26 | Stop reason: HOSPADM

## 2024-11-24 RX ORDER — ONDANSETRON 2 MG/ML
4 INJECTION INTRAMUSCULAR; INTRAVENOUS EVERY 6 HOURS PRN
Status: DISCONTINUED | OUTPATIENT
Start: 2024-11-24 | End: 2024-11-26 | Stop reason: HOSPADM

## 2024-11-24 RX ORDER — DOCUSATE SODIUM 100 MG/1
100 CAPSULE, LIQUID FILLED ORAL DAILY
Status: DISCONTINUED | OUTPATIENT
Start: 2024-11-25 | End: 2024-11-26 | Stop reason: HOSPADM

## 2024-11-24 RX ORDER — SODIUM CHLORIDE 9 MG/ML
INJECTION, SOLUTION INTRAVENOUS PRN
Status: DISCONTINUED | OUTPATIENT
Start: 2024-11-24 | End: 2024-11-26 | Stop reason: HOSPADM

## 2024-11-24 RX ORDER — SODIUM CHLORIDE 0.9 % (FLUSH) 0.9 %
5-40 SYRINGE (ML) INJECTION EVERY 12 HOURS SCHEDULED
Status: DISCONTINUED | OUTPATIENT
Start: 2024-11-24 | End: 2024-11-26 | Stop reason: HOSPADM

## 2024-11-24 RX ORDER — SODIUM CHLORIDE 9 MG/ML
1000 INJECTION, SOLUTION INTRAVENOUS CONTINUOUS
Status: DISCONTINUED | OUTPATIENT
Start: 2024-11-24 | End: 2024-11-26 | Stop reason: HOSPADM

## 2024-11-24 RX ORDER — PRAVASTATIN SODIUM 40 MG
80 TABLET ORAL DAILY
Status: DISCONTINUED | OUTPATIENT
Start: 2024-11-25 | End: 2024-11-26 | Stop reason: HOSPADM

## 2024-11-24 RX ORDER — MIRTAZAPINE 15 MG/1
15 TABLET, FILM COATED ORAL NIGHTLY
COMMUNITY

## 2024-11-24 RX ORDER — POLYETHYLENE GLYCOL 3350 17 G/17G
17 POWDER, FOR SOLUTION ORAL DAILY PRN
Status: DISCONTINUED | OUTPATIENT
Start: 2024-11-24 | End: 2024-11-26 | Stop reason: HOSPADM

## 2024-11-24 RX ORDER — SODIUM CHLORIDE 9 MG/ML
INJECTION, SOLUTION INTRAVENOUS CONTINUOUS
Status: ACTIVE | OUTPATIENT
Start: 2024-11-24 | End: 2024-11-25

## 2024-11-24 RX ORDER — METOCLOPRAMIDE 10 MG/1
10 TABLET ORAL 4 TIMES DAILY
Status: ON HOLD | COMMUNITY
End: 2024-11-26 | Stop reason: HOSPADM

## 2024-11-24 RX ORDER — ENOXAPARIN SODIUM 100 MG/ML
40 INJECTION SUBCUTANEOUS NIGHTLY
Status: DISCONTINUED | OUTPATIENT
Start: 2024-11-24 | End: 2024-11-26 | Stop reason: HOSPADM

## 2024-11-24 RX ORDER — SODIUM CHLORIDE 0.9 % (FLUSH) 0.9 %
5-40 SYRINGE (ML) INJECTION PRN
Status: DISCONTINUED | OUTPATIENT
Start: 2024-11-24 | End: 2024-11-26 | Stop reason: HOSPADM

## 2024-11-24 RX ORDER — IOPAMIDOL 755 MG/ML
75 INJECTION, SOLUTION INTRAVASCULAR
Status: COMPLETED | OUTPATIENT
Start: 2024-11-24 | End: 2024-11-24

## 2024-11-24 RX ORDER — ACETAMINOPHEN 650 MG/1
650 SUPPOSITORY RECTAL EVERY 6 HOURS PRN
Status: DISCONTINUED | OUTPATIENT
Start: 2024-11-24 | End: 2024-11-26 | Stop reason: HOSPADM

## 2024-11-24 RX ORDER — AZITHROMYCIN 250 MG/1
500 TABLET, FILM COATED ORAL EVERY 24 HOURS
Status: DISCONTINUED | OUTPATIENT
Start: 2024-11-25 | End: 2024-11-26

## 2024-11-24 RX ORDER — GUAIFENESIN 600 MG/1
1200 TABLET, EXTENDED RELEASE ORAL 2 TIMES DAILY
Status: DISCONTINUED | OUTPATIENT
Start: 2024-11-24 | End: 2024-11-26 | Stop reason: HOSPADM

## 2024-11-24 RX ADMIN — MORPHINE SULFATE 4 MG: 4 INJECTION, SOLUTION INTRAMUSCULAR; INTRAVENOUS at 16:47

## 2024-11-24 RX ADMIN — HYDROMORPHONE HYDROCHLORIDE 1 MG: 1 INJECTION, SOLUTION INTRAMUSCULAR; INTRAVENOUS; SUBCUTANEOUS at 19:44

## 2024-11-24 RX ADMIN — GUAIFENESIN 1200 MG: 600 TABLET, EXTENDED RELEASE ORAL at 22:36

## 2024-11-24 RX ADMIN — SODIUM CHLORIDE 1000 ML: 9 INJECTION, SOLUTION INTRAVENOUS at 19:45

## 2024-11-24 RX ADMIN — ACETAMINOPHEN 650 MG: 325 TABLET ORAL at 22:36

## 2024-11-24 RX ADMIN — IOPAMIDOL 75 ML: 755 INJECTION, SOLUTION INTRAVENOUS at 17:05

## 2024-11-24 RX ADMIN — SODIUM CHLORIDE 1000 MG: 900 INJECTION INTRAVENOUS at 19:46

## 2024-11-24 RX ADMIN — ONDANSETRON 4 MG: 2 INJECTION INTRAMUSCULAR; INTRAVENOUS at 16:47

## 2024-11-24 RX ADMIN — SODIUM CHLORIDE 1000 ML: 9 INJECTION, SOLUTION INTRAVENOUS at 16:35

## 2024-11-24 RX ADMIN — SODIUM CHLORIDE: 9 INJECTION, SOLUTION INTRAVENOUS at 22:40

## 2024-11-24 RX ADMIN — ONDANSETRON 4 MG: 2 INJECTION INTRAMUSCULAR; INTRAVENOUS at 22:35

## 2024-11-24 RX ADMIN — AZITHROMYCIN MONOHYDRATE 500 MG: 500 INJECTION, POWDER, LYOPHILIZED, FOR SOLUTION INTRAVENOUS at 21:07

## 2024-11-24 RX ADMIN — ENOXAPARIN SODIUM 40 MG: 100 INJECTION SUBCUTANEOUS at 22:36

## 2024-11-24 ASSESSMENT — PAIN SCALES - GENERAL
PAINLEVEL_OUTOF10: 9
PAINLEVEL_OUTOF10: 4
PAINLEVEL_OUTOF10: 6
PAINLEVEL_OUTOF10: 4
PAINLEVEL_OUTOF10: 6
PAINLEVEL_OUTOF10: 6
PAINLEVEL_OUTOF10: 0

## 2024-11-24 ASSESSMENT — PAIN DESCRIPTION - DESCRIPTORS: DESCRIPTORS: ACHING

## 2024-11-24 ASSESSMENT — PAIN DESCRIPTION - LOCATION: LOCATION: OTHER (COMMENT)

## 2024-11-24 ASSESSMENT — PAIN - FUNCTIONAL ASSESSMENT: PAIN_FUNCTIONAL_ASSESSMENT: 0-10

## 2024-11-24 ASSESSMENT — PAIN DESCRIPTION - ORIENTATION: ORIENTATION: MID

## 2024-11-24 NOTE — ED PROVIDER NOTES
Patient is being seen in conjunction with BEVERLY Grace Mayer, please see her documentation for further details.  I did assess the patient personally.      EKG interpreted by me at 1525 hrs., sinus tachycardia, heart rate 104, no acute ST elevations or depressions.  ND interval 156 ms, QRS duration 96 ms, QT/QTc 336/441 ms.  PRT axes 61-62-65.  No significant changes other than sinus tachycardia when compared to his most recent EKG obtained on 2/3/2021.            (Please note that portions ofthis note were completed with a voice recognition program. Efforts were made to edit the dictations but occasionally words are mis-transcribed.)   
mediastinum.  He has a groundglass infiltrate scattered in the right lung and both lung bases posteriorly could represent early infiltrates from pneumonia.  Questionable mild adrenal hyperplasia.  Pulmonary nodules in the right lung measuring up to 5 mm    On reevaluation I did review entire diagnostic evaluation with the patient including my recommendation for admission for further treatment of his condition. He is meeting criteria for sepsis with an improving lactic. Orders placed for antibiotic coverage. Consult placed to hospital medicine team at this time.     Social Determinants Significantly Affecting Health : Patient is a current daily smoker, he was counseled on smoking cessation.    Is this patient to be included in the SEP-1 Core Measure due to severe sepsis or septic shock?   Yes     SEP-1 CORE MEASURE DATA      Sepsis Criteria   Severe Sepsis Criteria   Septic Shock Criteria     Must be confirmed or suspected to move forward with diagnosis of sepsis.    Must meet 2:    [] Temperature > 100.9 F (38.3 C)        or < 96.8 F (36 C)  [x] HR > 90  [] RR > 20  [x] WBC > 12 or < 4 or 10% bands      AND:      [x] Infection Confirmed or        Suspected.     Must meet 1:    [x] Lactate > 2       or   [] Signs of Organ Dysfunction:    - SBP < 90 or MAP < 65  - Altered mental status  - Creatinine > 2 or increased from      baseline  - Urine Output < 0.5 ml/kg/hr  - Bilirubin > 2  - INR > 1.5 (not anticoagulated)  - Platelets < 100,000  - Acute Respiratory Failure as     evidenced by new need for NIPPV     or mechanical ventilation      [] No criteria met for Severe Sepsis.   Must meet 1:    [] Lactate > 4        or   [] SBP < 90 or MAP < 65 for at        least two readings in the first        hour after fluid bolus        administration      [] Vasopressors initiated (if hypotension persists after fluid resuscitation)        [x] No criteria met for Septic Shock.   No data found.     Recent Labs     11/26/24  3766

## 2024-11-24 NOTE — ED NOTES
Pt arrived incontinent of urine. Pts clothing saturated in urine. Pt was ok with shirt being cut off due to c-collar. Hygiene provided to patient. Pt was gowned, belongings placed in plastic belongings bag at bedside.
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
## 2024-11-25 PROBLEM — R91.1 LUNG NODULE: Status: ACTIVE | Noted: 2024-11-25

## 2024-11-25 PROBLEM — M62.82 RHABDOMYOLYSIS: Status: ACTIVE | Noted: 2024-11-25

## 2024-11-25 PROBLEM — K59.00 CONSTIPATION: Status: ACTIVE | Noted: 2024-11-25

## 2024-11-25 PROBLEM — Z72.0 TOBACCO ABUSE: Status: ACTIVE | Noted: 2024-11-25

## 2024-11-25 PROBLEM — J18.9 COMMUNITY ACQUIRED PNEUMONIA: Status: ACTIVE | Noted: 2024-11-25

## 2024-11-25 LAB
ALBUMIN SERPL-MCNC: 3.1 G/DL (ref 3.4–5)
ALBUMIN/GLOB SERPL: 1.2 {RATIO} (ref 1.1–2.2)
ALP SERPL-CCNC: 71 U/L (ref 40–129)
ALT SERPL-CCNC: 56 U/L (ref 10–40)
AMPHETAMINES UR QL SCN>1000 NG/ML: ABNORMAL
ANION GAP SERPL CALCULATED.3IONS-SCNC: 10 MMOL/L (ref 3–16)
AST SERPL-CCNC: 159 U/L (ref 15–37)
BACTERIA URNS QL MICRO: ABNORMAL /HPF
BARBITURATES UR QL SCN>200 NG/ML: ABNORMAL
BASOPHILS # BLD: 0.2 K/UL (ref 0–0.2)
BASOPHILS NFR BLD: 1 %
BENZODIAZ UR QL SCN>200 NG/ML: POSITIVE
BILIRUB SERPL-MCNC: 0.4 MG/DL (ref 0–1)
BILIRUB UR QL STRIP.AUTO: NEGATIVE
BUN SERPL-MCNC: 22 MG/DL (ref 7–20)
CALCIUM SERPL-MCNC: 7.9 MG/DL (ref 8.3–10.6)
CANNABINOIDS UR QL SCN>50 NG/ML: POSITIVE
CHLORIDE SERPL-SCNC: 104 MMOL/L (ref 99–110)
CK SERPL-CCNC: 4824 U/L (ref 39–308)
CLARITY UR: CLEAR
CO2 SERPL-SCNC: 24 MMOL/L (ref 21–32)
COCAINE UR QL SCN: ABNORMAL
COLOR UR: YELLOW
CREAT SERPL-MCNC: 0.7 MG/DL (ref 0.9–1.3)
DEPRECATED RDW RBC AUTO: 13.7 % (ref 12.4–15.4)
DRUG SCREEN COMMENT UR-IMP: ABNORMAL
EOSINOPHIL # BLD: 0.2 K/UL (ref 0–0.6)
EOSINOPHIL NFR BLD: 1 %
FENTANYL SCREEN, URINE: ABNORMAL
FINE GRAN CASTS #/AREA URNS HPF: ABNORMAL /LPF (ref 0–2)
FLUAV RNA RESP QL NAA+PROBE: NOT DETECTED
FLUBV RNA RESP QL NAA+PROBE: NOT DETECTED
GFR SERPLBLD CREATININE-BSD FMLA CKD-EPI: >90 ML/MIN/{1.73_M2}
GLUCOSE BLD-MCNC: 147 MG/DL (ref 70–99)
GLUCOSE BLD-MCNC: 173 MG/DL (ref 70–99)
GLUCOSE BLD-MCNC: 216 MG/DL (ref 70–99)
GLUCOSE BLD-MCNC: 238 MG/DL (ref 70–99)
GLUCOSE SERPL-MCNC: 118 MG/DL (ref 70–99)
GLUCOSE UR STRIP.AUTO-MCNC: >=1000 MG/DL
HCT VFR BLD AUTO: 41 % (ref 40.5–52.5)
HGB BLD-MCNC: 13.5 G/DL (ref 13.5–17.5)
HGB UR QL STRIP.AUTO: ABNORMAL
KETONES UR STRIP.AUTO-MCNC: NEGATIVE MG/DL
LEUKOCYTE ESTERASE UR QL STRIP.AUTO: NEGATIVE
LYMPHOCYTES # BLD: 2.9 K/UL (ref 1–5.1)
LYMPHOCYTES NFR BLD: 15 %
MCH RBC QN AUTO: 32.2 PG (ref 26–34)
MCHC RBC AUTO-ENTMCNC: 33 G/DL (ref 31–36)
MCV RBC AUTO: 97.8 FL (ref 80–100)
METHADONE UR QL SCN>300 NG/ML: ABNORMAL
MONOCYTES # BLD: 1.6 K/UL (ref 0–1.3)
MONOCYTES NFR BLD: 8 %
MUCOUS THREADS #/AREA URNS LPF: ABNORMAL /LPF
NEUTROPHILS # BLD: 14.6 K/UL (ref 1.7–7.7)
NEUTROPHILS NFR BLD: 74 %
NEUTS BAND NFR BLD MANUAL: 1 % (ref 0–7)
NITRITE UR QL STRIP.AUTO: NEGATIVE
OPIATES UR QL SCN>300 NG/ML: POSITIVE
OXYCODONE UR QL SCN: ABNORMAL
PCP UR QL SCN>25 NG/ML: ABNORMAL
PERFORMED ON: ABNORMAL
PH UR STRIP.AUTO: 5.5 [PH] (ref 5–8)
PH UR STRIP: 5.5 [PH]
PLATELET # BLD AUTO: 194 K/UL (ref 135–450)
PLATELET BLD QL SMEAR: ADEQUATE
PMV BLD AUTO: 8.4 FL (ref 5–10.5)
POIKILOCYTOSIS BLD QL SMEAR: ABNORMAL
POTASSIUM SERPL-SCNC: 3.8 MMOL/L (ref 3.5–5.1)
PROCALCITONIN SERPL IA-MCNC: 4.75 NG/ML (ref 0–0.15)
PROT SERPL-MCNC: 5.6 G/DL (ref 6.4–8.2)
PROT UR STRIP.AUTO-MCNC: 100 MG/DL
RBC # BLD AUTO: 4.19 M/UL (ref 4.2–5.9)
RBC #/AREA URNS HPF: ABNORMAL /HPF (ref 0–4)
RENAL EPI CELLS #/AREA UR COMP ASSIST: ABNORMAL /HPF (ref 0–1)
SARS-COV-2 RNA RESP QL NAA+PROBE: NOT DETECTED
SLIDE REVIEW: ABNORMAL
SODIUM SERPL-SCNC: 138 MMOL/L (ref 136–145)
SP GR UR STRIP.AUTO: 1.02 (ref 1–1.03)
UA COMPLETE W REFLEX CULTURE PNL UR: ABNORMAL
UA DIPSTICK W REFLEX MICRO PNL UR: YES
URN SPEC COLLECT METH UR: ABNORMAL
UROBILINOGEN UR STRIP-ACNC: 0.2 E.U./DL
WBC # BLD AUTO: 19.5 K/UL (ref 4–11)
WBC #/AREA URNS HPF: ABNORMAL /HPF (ref 0–5)

## 2024-11-25 PROCEDURE — 36415 COLL VENOUS BLD VENIPUNCTURE: CPT

## 2024-11-25 PROCEDURE — 6370000000 HC RX 637 (ALT 250 FOR IP): Performed by: INTERNAL MEDICINE

## 2024-11-25 PROCEDURE — 85025 COMPLETE CBC W/AUTO DIFF WBC: CPT

## 2024-11-25 PROCEDURE — 2580000003 HC RX 258: Performed by: STUDENT IN AN ORGANIZED HEALTH CARE EDUCATION/TRAINING PROGRAM

## 2024-11-25 PROCEDURE — 6370000000 HC RX 637 (ALT 250 FOR IP): Performed by: REGISTERED NURSE

## 2024-11-25 PROCEDURE — 97530 THERAPEUTIC ACTIVITIES: CPT

## 2024-11-25 PROCEDURE — 6360000002 HC RX W HCPCS: Performed by: STUDENT IN AN ORGANIZED HEALTH CARE EDUCATION/TRAINING PROGRAM

## 2024-11-25 PROCEDURE — 2580000003 HC RX 258: Performed by: REGISTERED NURSE

## 2024-11-25 PROCEDURE — 94761 N-INVAS EAR/PLS OXIMETRY MLT: CPT

## 2024-11-25 PROCEDURE — 99255 IP/OBS CONSLTJ NEW/EST HI 80: CPT | Performed by: INTERNAL MEDICINE

## 2024-11-25 PROCEDURE — 94669 MECHANICAL CHEST WALL OSCILL: CPT

## 2024-11-25 PROCEDURE — 1200000000 HC SEMI PRIVATE

## 2024-11-25 PROCEDURE — 97166 OT EVAL MOD COMPLEX 45 MIN: CPT

## 2024-11-25 PROCEDURE — 6370000000 HC RX 637 (ALT 250 FOR IP): Performed by: STUDENT IN AN ORGANIZED HEALTH CARE EDUCATION/TRAINING PROGRAM

## 2024-11-25 PROCEDURE — 97162 PT EVAL MOD COMPLEX 30 MIN: CPT

## 2024-11-25 PROCEDURE — 81001 URINALYSIS AUTO W/SCOPE: CPT

## 2024-11-25 PROCEDURE — 84145 PROCALCITONIN (PCT): CPT

## 2024-11-25 PROCEDURE — 6370000000 HC RX 637 (ALT 250 FOR IP): Performed by: NURSE PRACTITIONER

## 2024-11-25 PROCEDURE — 87636 SARSCOV2 & INF A&B AMP PRB: CPT

## 2024-11-25 PROCEDURE — 97535 SELF CARE MNGMENT TRAINING: CPT

## 2024-11-25 PROCEDURE — 99233 SBSQ HOSP IP/OBS HIGH 50: CPT | Performed by: INTERNAL MEDICINE

## 2024-11-25 PROCEDURE — 82550 ASSAY OF CK (CPK): CPT

## 2024-11-25 PROCEDURE — 80307 DRUG TEST PRSMV CHEM ANLYZR: CPT

## 2024-11-25 PROCEDURE — 2580000003 HC RX 258

## 2024-11-25 PROCEDURE — 80053 COMPREHEN METABOLIC PANEL: CPT

## 2024-11-25 PROCEDURE — 6360000002 HC RX W HCPCS: Performed by: REGISTERED NURSE

## 2024-11-25 RX ORDER — METHYLPREDNISOLONE SODIUM SUCCINATE 40 MG/ML
60 INJECTION INTRAMUSCULAR; INTRAVENOUS EVERY 8 HOURS
Status: DISCONTINUED | OUTPATIENT
Start: 2024-11-25 | End: 2024-11-25

## 2024-11-25 RX ORDER — WATER 10 ML/10ML
INJECTION INTRAMUSCULAR; INTRAVENOUS; SUBCUTANEOUS
Status: COMPLETED
Start: 2024-11-25 | End: 2024-11-25

## 2024-11-25 RX ORDER — IPRATROPIUM BROMIDE AND ALBUTEROL SULFATE 2.5; .5 MG/3ML; MG/3ML
1 SOLUTION RESPIRATORY (INHALATION) EVERY 4 HOURS PRN
Status: DISCONTINUED | OUTPATIENT
Start: 2024-11-25 | End: 2024-11-26 | Stop reason: HOSPADM

## 2024-11-25 RX ORDER — POLYETHYLENE GLYCOL 3350 17 G
2 POWDER IN PACKET (EA) ORAL
Status: DISCONTINUED | OUTPATIENT
Start: 2024-11-25 | End: 2024-11-26 | Stop reason: HOSPADM

## 2024-11-25 RX ORDER — NICOTINE 21 MG/24HR
1 PATCH, TRANSDERMAL 24 HOURS TRANSDERMAL DAILY
Status: DISCONTINUED | OUTPATIENT
Start: 2024-11-25 | End: 2024-11-26 | Stop reason: HOSPADM

## 2024-11-25 RX ORDER — VERAPAMIL HYDROCHLORIDE 120 MG/1
120 TABLET, FILM COATED, EXTENDED RELEASE ORAL 2 TIMES DAILY
Status: DISCONTINUED | OUTPATIENT
Start: 2024-11-25 | End: 2024-11-26 | Stop reason: HOSPADM

## 2024-11-25 RX ADMIN — HYDROMORPHONE HYDROCHLORIDE 1 MG: 1 INJECTION, SOLUTION INTRAMUSCULAR; INTRAVENOUS; SUBCUTANEOUS at 18:56

## 2024-11-25 RX ADMIN — SUCRALFATE 1 G: 1 TABLET ORAL at 01:18

## 2024-11-25 RX ADMIN — ENOXAPARIN SODIUM 40 MG: 100 INJECTION SUBCUTANEOUS at 20:39

## 2024-11-25 RX ADMIN — ACETAMINOPHEN 650 MG: 325 TABLET ORAL at 08:33

## 2024-11-25 RX ADMIN — NICOTINE POLACRILEX 2 MG: 2 LOZENGE ORAL at 12:20

## 2024-11-25 RX ADMIN — AZITHROMYCIN 500 MG: 250 TABLET, FILM COATED ORAL at 18:55

## 2024-11-25 RX ADMIN — HYDROMORPHONE HYDROCHLORIDE 1 MG: 1 INJECTION, SOLUTION INTRAMUSCULAR; INTRAVENOUS; SUBCUTANEOUS at 05:48

## 2024-11-25 RX ADMIN — NICOTINE POLACRILEX 2 MG: 2 LOZENGE ORAL at 15:41

## 2024-11-25 RX ADMIN — SUCRALFATE 1 G: 1 TABLET ORAL at 12:20

## 2024-11-25 RX ADMIN — SUCRALFATE 1 G: 1 TABLET ORAL at 15:36

## 2024-11-25 RX ADMIN — INSULIN GLARGINE 30 UNITS: 100 INJECTION, SOLUTION SUBCUTANEOUS at 08:26

## 2024-11-25 RX ADMIN — GLIPIZIDE 10 MG: 5 TABLET ORAL at 05:38

## 2024-11-25 RX ADMIN — ACETAMINOPHEN 650 MG: 325 TABLET ORAL at 15:36

## 2024-11-25 RX ADMIN — SUCRALFATE 1 G: 1 TABLET ORAL at 08:26

## 2024-11-25 RX ADMIN — SUCRALFATE 1 G: 1 TABLET ORAL at 20:38

## 2024-11-25 RX ADMIN — INSULIN GLARGINE 30 UNITS: 100 INJECTION, SOLUTION SUBCUTANEOUS at 20:38

## 2024-11-25 RX ADMIN — SODIUM CHLORIDE: 9 INJECTION, SOLUTION INTRAVENOUS at 16:08

## 2024-11-25 RX ADMIN — VERAPAMIL HYDROCHLORIDE 120 MG: 120 TABLET, FILM COATED, EXTENDED RELEASE ORAL at 20:39

## 2024-11-25 RX ADMIN — GUAIFENESIN 1200 MG: 600 TABLET, EXTENDED RELEASE ORAL at 08:26

## 2024-11-25 RX ADMIN — SODIUM CHLORIDE 1000 MG: 900 INJECTION INTRAVENOUS at 18:59

## 2024-11-25 RX ADMIN — GUAIFENESIN 1200 MG: 600 TABLET, EXTENDED RELEASE ORAL at 20:38

## 2024-11-25 RX ADMIN — HYDROMORPHONE HYDROCHLORIDE 1 MG: 1 INJECTION, SOLUTION INTRAMUSCULAR; INTRAVENOUS; SUBCUTANEOUS at 01:18

## 2024-11-25 RX ADMIN — SODIUM CHLORIDE, PRESERVATIVE FREE 10 ML: 5 INJECTION INTRAVENOUS at 00:37

## 2024-11-25 RX ADMIN — METHYLPREDNISOLONE SODIUM SUCCINATE 60 MG: 40 INJECTION INTRAMUSCULAR; INTRAVENOUS at 07:07

## 2024-11-25 RX ADMIN — WATER 20 ML: 1 INJECTION INTRAMUSCULAR; INTRAVENOUS; SUBCUTANEOUS at 07:07

## 2024-11-25 ASSESSMENT — PAIN SCALES - GENERAL
PAINLEVEL_OUTOF10: 9
PAINLEVEL_OUTOF10: 5
PAINLEVEL_OUTOF10: 0
PAINLEVEL_OUTOF10: 0
PAINLEVEL_OUTOF10: 8
PAINLEVEL_OUTOF10: 0
PAINLEVEL_OUTOF10: 8
PAINLEVEL_OUTOF10: 0
PAINLEVEL_OUTOF10: 7

## 2024-11-25 ASSESSMENT — PAIN DESCRIPTION - DESCRIPTORS
DESCRIPTORS: THROBBING
DESCRIPTORS: ACHING
DESCRIPTORS: ACHING
DESCRIPTORS: THROBBING
DESCRIPTORS: ACHING

## 2024-11-25 ASSESSMENT — PAIN - FUNCTIONAL ASSESSMENT
PAIN_FUNCTIONAL_ASSESSMENT: PREVENTS OR INTERFERES SOME ACTIVE ACTIVITIES AND ADLS

## 2024-11-25 ASSESSMENT — PAIN DESCRIPTION - LOCATION
LOCATION: FACE
LOCATION: OTHER (COMMENT)
LOCATION: FACE

## 2024-11-25 ASSESSMENT — PAIN DESCRIPTION - ORIENTATION
ORIENTATION: RIGHT
ORIENTATION: MID

## 2024-11-25 ASSESSMENT — PAIN DESCRIPTION - PAIN TYPE
TYPE: ACUTE PAIN
TYPE: ACUTE PAIN

## 2024-11-25 ASSESSMENT — PAIN DESCRIPTION - FREQUENCY
FREQUENCY: CONTINUOUS
FREQUENCY: CONTINUOUS

## 2024-11-25 ASSESSMENT — PAIN DESCRIPTION - ONSET
ONSET: GRADUAL
ONSET: GRADUAL

## 2024-11-25 NOTE — PROGRESS NOTES
BP (!) 153/82   Pulse 93   Temp 97.4 °F (36.3 °C) (Oral)   Resp 18   Ht 1.651 m (5' 5\")   Wt 92.9 kg (204 lb 11.2 oz)   SpO2 95%   BMI 34.06 kg/m²     Assessment complete. Meds passed. Pt denies needs at this time. Patient weak, having some pain to right side of face. Pleasant. Gave prn tylenol a couple times, with some relief to a tolerable level of pain. Swollen right eye, however does appear to be able to open eye more and looks a bit less swollen progressively throughout the shift. Family at bedside.         Bedside Mobility Assessment Tool (BMAT):     Assessment Level 1- Sit and Shake    1. From a semi-reclined position, ask patient to sit up and rotate to a seated position at the side of the bed. Can use the bedrail.    2. Ask patient to reach out and grab your hand and shake making sure patient reaches across his/her midline.   Pass- Patient is able to come to a seated position, maintain core strength. Maintains seated balance while reaching across midline. Move on to Assessment Level 2.     Assessment Level 2- Stretch and Point   1. With patient in seated position at the side of the bed, have patient place both feet on the floor (or stool) with knees no higher than hips.    2. Ask patient to stretch one leg and straighten the knee, then bend the ankle/flex and point the toes. If appropriate, repeat with the other leg.   Pass- Patient is able to demonstrate appropriate quad strength on intended weight bearing limb(s). Move onto Assessment Level 3.     Assessment Level 3- Stand   1. Ask patient to elevate off the bed or chair (seated to standing) using an assistive device (cane, bedrail).    2. Patient should be able to raise buttocks off be and hold for a count of five. May repeat once.   Pass- Patient maintains standing stability for at least 5 seconds, proceed to assessment level 4.    Assessment Level 4- Walk   1. Ask patient to march in place at bedside.    2. Then ask patient to advance step and

## 2024-11-25 NOTE — PROGRESS NOTES
.4 Eyes Skin Assessment     NAME:  Terell Sykes  YOB: 1970  MEDICAL RECORD NUMBER:  [id number]    The patient is being assessed for  Admission    I agree that at least one RN has performed a thorough Head to Toe Skin Assessment on the patient. ALL assessment sites listed below have been assessed.      Areas assessed by both nurses:    Head, Face, Ears, Shoulders, Back, Chest, Arms, Elbows, Hands, Sacrum. Buttock, Coccyx, Ischium, and Legs. Feet and Heels        Does the Patient have a Wound? Yes wound(s) were present on assessment. LDA wound assessment was Initiated and completed by RN       Christopher Prevention initiated by RN: Yes  Wound Care Orders initiated by RN: Yes    Pressure Injury (Stage 3,4, Unstageable, DTI, NWPT, and Complex wounds) if present, place Wound referral order by RN under : No    New Ostomies, if present place, Ostomy referral order under : No     Nurse 1 eSignature: Electronically signed by JONATHON CARTER RN on 11/25/24 at 3:08 AM EST    **SHARE this note so that the co-signing nurse can place an eSignature**    Nurse 2 eSignature: Electronically signed by Homer Deleon RN on 11/25/24 at 4:28 AM EST

## 2024-11-25 NOTE — DISCHARGE INSTR - COC
Continuity of Care Form    Patient Name: Terell Sykes   :  1970  MRN:  [id number]    Admit date:  2024  Discharge date:  ***    Code Status Order: Full Code   Advance Directives:   Advance Care Flowsheet Documentation             Admitting Physician:  Janine Ordoñez DO  PCP: Maryjane Ruvalcaba PA-C    Discharging Nurse: ***  Discharging Hospital Unit/Room#: 0223/0223-01  Discharging Unit Phone Number: ***    Emergency Contact:   Extended Emergency Contact Information  Primary Emergency Contact: antony santiago  Address: [address] [place], [place] [address] [facility]  Home Phone: [phone]  Mobile Phone: [phone]  Relation: Brother/Sister  Secondary Emergency Contact: melody santiago           [facility]  Home Phone: [phone]  Mobile Phone: [phone]  Relation: Niece/Nephew    Past Surgical History:  Past Surgical History:   Procedure Laterality Date    CHOLECYSTECTOMY      COLONOSCOPY      ESOPHAGOGASTRODUODENOSCOPY      LUMBAR SPINE SURGERY N/A 3/24/2023    MICROLUMBAR DISCECTOMY LUMBAR 4 - LUMBAR 5           **LATEX SENSITIVE** performed by Masood Carrillo MD at [facility] OR    TONSILLECTOMY         Immunization History:   Immunization History   Administered Date(s) Administered    COVID-19, MODERNA BLUE border, Primary or Immunocompromised, (age 12y+), IM, 100 mcg/0.5mL 2021    COVID-19, MODERNA Bivalent, (age 12y+), IM, 50 mcg/0.5 mL 2022       Active Problems:  Patient Active Problem List   Diagnosis Code    Sepsis ([facility]) A41.9    COPD (chronic obstructive pulmonary disease) ([facility]) J44.9    Diabetes mellitus (HCC) E11.9    Gastroparesis K31.84    GERD (gastroesophageal reflux disease) K21.9    Hyperlipidemia E78.5    Hypertension I10    Rhabdomyolysis M62.82    Fall W19.XXXA    Community acquired pneumonia J18.9    Constipation K59.00    Lung nodule R91.1       Isolation/Infection:   Isolation            Droplet Plus          Patient  CC Vent List:911441084}    Rehab Therapies: {THERAPEUTIC INTERVENTION:8100249063}  Weight Bearing Status/Restrictions: { CC Weight Bearin}  Other Medical Equipment (for information only, NOT a DME order):  {EQUIPMENT:059017881}  Other Treatments: ***    Patient's personal belongings (please select all that are sent with patient):  {CHP DME Belongings:961129382}    RN SIGNATURE:  {Esignature:460300851}    CASE MANAGEMENT/SOCIAL WORK SECTION    Inpatient Status Date: 24 IP    Readmission Risk Assessment Score: 14%  Readmission Risk              Risk of Unplanned Readmission:  18           Discharging to Facility/ Agency   Name: [facility]   Address:  Phone: [phone]  Fax:        / signature: Electronically signed by Corry De Guzman RN on 24 at 3:05 PM EST    PHYSICIAN SECTION    Prognosis: {Prognosis:7511656759}    Condition at Discharge: { Patient Condition:357031243}    Rehab Potential (if transferring to Rehab): {Prognosis:7240728678}    Recommended Labs or Other Treatments After Discharge: ***    Physician Certification: I certify the above information and transfer of Terell Sykes  is necessary for the continuing treatment of the diagnosis listed and that he requires {Admit to Appropriate Level of Care:40626} for {GREATER/LESS:060200811} 30 days.     Update Admission H&P: {CHP DME Changes in HandP:630978004}    PHYSICIAN SIGNATURE:  {Esignature:918405419}

## 2024-11-25 NOTE — PLAN OF CARE
Problem: Chronic Conditions and Co-morbidities  Goal: Patient's chronic conditions and co-morbidity symptoms are monitored and maintained or improved  11/25/2024 1632 by Jas Nazario RN  Outcome: Progressing  11/25/2024 0257 by Yisel Shaw RN  Outcome: Progressing  11/25/2024 0256 by Yisel Shaw RN  Outcome: Progressing     Problem: Discharge Planning  Goal: Discharge to home or other facility with appropriate resources  11/25/2024 0257 by Yisel Shaw RN  Outcome: Progressing  11/25/2024 0256 by Yisel Shaw RN  Outcome: Progressing     Problem: Pain  Goal: Verbalizes/displays adequate comfort level or baseline comfort level  11/25/2024 1632 by Jas Nazario RN  Outcome: Progressing  11/25/2024 0257 by Yisel Shaw RN  Outcome: Progressing  11/25/2024 0256 by Yisel Shaw RN  Outcome: Progressing     Problem: Skin/Tissue Integrity  Goal: Absence of new skin breakdown  Description: 1.  Monitor for areas of redness and/or skin breakdown  2.  Assess vascular access sites hourly  3.  Every 4-6 hours minimum:  Change oxygen saturation probe site  4.  Every 4-6 hours:  If on nasal continuous positive airway pressure, respiratory therapy assess nares and determine need for appliance change or resting period.  11/25/2024 0257 by Yisel Shaw RN  Outcome: Progressing  11/25/2024 0256 by Yisel Shaw RN  Outcome: Progressing     Problem: Safety - Adult  Goal: Free from fall injury  Outcome: Progressing

## 2024-11-25 NOTE — PROGRESS NOTES
Inpatient Physical Therapy Evaluation & Treatment    Unit: [facility]  Date:  11/25/2024  Patient Name:    Terell Sykes  Admitting diagnosis:  Lung nodule [R91.1]  Periumbilical hernia [K42.9]  Elevated troponin [R79.89]  Elevated brain natriuretic peptide (BNP) level [R79.89]  Fall, initial encounter [W19.XXXA]  Sepsis (HCC) [A41.9]  Severe sepsis (HCC) [A41.9, R65.20]  Traumatic rhabdomyolysis, initial encounter ([facility]) [T79.6XXA]  Constipation, unspecified constipation type [K59.00]  Inguinal hernia without obstruction or gangrene, recurrence not specified, unspecified laterality [K40.90]  Community acquired pneumonia, unspecified laterality [J18.9]  Admit Date:  11/24/2024  Precautions/Restrictions/WB Status/ Lines/ Wounds/ Oxygen: Fall risk, Bed/chair alarm, Lines (IV, Supplemental O2 (2L), and external catheter), Telemetry, and Isolation Precautions: Droplet Plus - COVID R/O      Pt seen for cotreatment this date due to patient safety, acute illness/injury, and limited functional status information    Treatment Time:  506-6790  Treatment Number:  1   Timed Code Treatment Minutes: 38 minutes  Total Treatment Minutes:  48  minutes    Patient Stated Goals for Therapy: \" To feel better \"          Discharge Recommendations: Home with initial 24 hr supervision, Home with initial 24 hr assistance, and Home PT  DME needs for discharge: RW       Therapy recommendation for EMS Transport: can transport by wheelchair    Therapy recommendations for staff:   Assist of 1 for transfers with use of rolling walker (RW) and gait belt to/from BSC  to/from chair    History of Present Illness:   Per Dr. Ordoñez H&P 11/24/24:    \"54 y.o. male with past medical of hypertension, hyperlipidemia, anxiety, depression, tobacco use, type 2 diabetes presented to emergency department complaint of fall, dyspnea.  Patient today and was walking to the kitchen to get a glass of milk and his legs felt extremely weak and he fell.  Patient states that he

## 2024-11-25 NOTE — PROGRESS NOTES
Pt admitted to room 0233 pt orientated to room. Pt sister at bedside. Pt stated he was in pain. PRN given. Pt arrived incontinent. pt was bathed grown and linens changed. Pt sister help with pt medication gave medication list with the best of her ability. Pharmacist Laly Diana called and stated pt last refilled of verapamil was a different dose that was review in chart. Informed pharmacist that sister provided information to the best of her ability and patient agreed. Pharmacist stated she can not verify patient medication and left medication home list in review. Informed Dr. Ordoñez who stated to hold medication until dose is verified. Informed patient. Pt Vital sign sable call light in reach bed alarm on.

## 2024-11-25 NOTE — CARE COORDINATION
Case Management Assessment  Initial Evaluation    Date/Time of Evaluation: 11/25/2024 9:32 AM  Assessment Completed by: Corry De Guzman RN    If patient is discharged prior to next notation, then this note serves as note for discharge by case management.    Patient Name: Terell Sykes                   YOB: 1970  Diagnosis: Lung nodule [R91.1]  Periumbilical hernia [K42.9]  Elevated troponin [R79.89]  Elevated brain natriuretic peptide (BNP) level [R79.89]  Fall, initial encounter [W19.XXXA]  Sepsis (HCC) [A41.9]  Severe sepsis (HCC) [A41.9, R65.20]  Traumatic rhabdomyolysis, initial encounter ([facility]) [T79.6XXA]  Constipation, unspecified constipation type [K59.00]  Inguinal hernia without obstruction or gangrene, recurrence not specified, unspecified laterality [K40.90]  Community acquired pneumonia, unspecified laterality [J18.9]                   Date / Time: 11/24/2024  3:04 PM    Patient Admission Status: Inpatient   Readmission Risk (Low < 19, Mod (19-27), High > 27): Readmission Risk Score: 13.6    Current PCP: Maryjane Ruvalcaba PA-C  PCP verified by CM? Yes    Chart Reviewed: Yes      History Provided by: Patient  Patient Orientation: Alert and Oriented    Patient Cognition: Alert    Hospitalization in the last 30 days (Readmission):  No    If yes, Readmission Assessment in CM Navigator will be completed.    Advance Directives:      Code Status: Full Code   Patient's Primary Decision Maker is: Legal Next of Kin      Discharge Planning:    Patient lives with: Alone Type of Home: Apartment  Primary Care Giver: Self  Patient Support Systems include: Family Members   Current Financial resources: Medicaid  Current community resources: None  Current services prior to admission: Durable Medical Equipment            Current DME: Cane            Type of Home Care services:  None    ADLS  Prior functional level: Independent in ADLs/IADLs  Current functional level: Independent in ADLs/IADLs    PT AM-PAC:    [K42.9]  Elevated troponin [R79.89]  Elevated brain natriuretic peptide (BNP) level [R79.89]  Fall, initial encounter [W19.XXXA]  Sepsis (HCC) [A41.9]  Severe sepsis (HCC) [A41.9, R65.20]  Traumatic rhabdomyolysis, initial encounter (HCC) [T79.6XXA]  Constipation, unspecified constipation type [K59.00]  Inguinal hernia without obstruction or gangrene, recurrence not specified, unspecified laterality [K40.90]  Community acquired pneumonia, unspecified laterality [J18.9]    IF APPLICABLE: The Patient and/or patient representative Terell and his family were provided with a choice of provider and agrees with the discharge plan. Freedom of choice list with basic dialogue that supports the patient's individualized plan of care/goals and shares the quality data associated with the providers was provided to:     Patient Representative Name:       The Patient and/or Patient Representative Agree with the Discharge Plan?      Corry De Guzman RN  Case Management Department  Ph: [phone] Fax: [phone]

## 2024-11-25 NOTE — H&P
Hospital Medicine History & Physical      Date of Admission: 11/24/2024    Date of Service:  Pt seen/examined on 11/24/2024    [x]Admitted to Inpatient with expected LOS greater than two midnights due to medical therapy.  []Placed in Observation status.    Chief Admission Complaint: Fall, dyspnea    Presenting Admission History:      54 y.o. male with past medical of hypertension, hyperlipidemia, anxiety, depression, tobacco use, type 2 diabetes presented to emergency department complaint of fall, dyspnea.  Patient today and was walking to the kitchen to get a glass of milk and his legs felt extremely weak and he fell.  Patient states that he was on became home patient does not have history of falls.  Patient did not headache prior to fall.  Patient states that his appetite is been in his baseline denies any urinary symptoms.  Patient states that he has been nauseous however it is at his baseline.  Also states that he has had diarrhea since his colonoscopy 2 months ago.  Patient denies rest of review of systems.    Assessment/Plan:      Current Principal Problem:  Sepsis (HCC)  Sepsis  - Secondary to pneumonia  - + HR, + RR, leukocytosis  - Status post fluid resuscitation, continue IV fluids, continue antibiotics, monitor for resolution    Community-acquired pneumonia  - Rocephin, azithromycin, Mucinex    Rhabdomyolysis  - CK = 7051  - IV fluids, trend    Constipation  - Colace    Type 2 diabetes  - Lantus, sliding scale insulin, carb controlled diet, BG ACHS    Tobacco use  - Nicotine patch    Hyperlipidemia  - Pravastatin    Hypertension  - Verapamil    Gastroparesis  - Sucralfate  Discussed management and the need for Hospitalization of the patient w/ the Emergency Department Provider: Moreno    CXR: I have reviewed the CXR with the following interpretation: No acute process  EKG:  I have reviewed the EKG with the following interpretation: Sinus tach    Physical Exam Performed:      /74   Pulse 91   Temp  7. Fat containing inguinal hernias without bowel involvement. 8. Minimal fat containing periumbilical hernia.     XR KNEE RIGHT (1-2 VIEWS)    Result Date: 11/24/2024  EXAMINATION: 2 XRAY VIEWS OF THE RIGHT KNEE 11/24/2024 5:59 pm COMPARISON: None. HISTORY: ORDERING SYSTEM PROVIDED HISTORY: pain TECHNOLOGIST PROVIDED HISTORY: Reason for exam:->pain Reason for Exam: knee pain FINDINGS: Negative for fracture or dislocation.  Tiny osteophytes of posterior patella. Dense vascular calcifications.  Negative for joint effusion.     No acute abnormality     CT CHEST PULMONARY EMBOLISM W CONTRAST    Result Date: 11/24/2024  EXAMINATION: CTA OF THE CHEST 11/24/2024 5:06 pm TECHNIQUE: CTA of the chest was performed after the administration of intravenous contrast.  Multiplanar reformatted images are provided for review.  MIP images are provided for review. Automated exposure control, iterative reconstruction, and/or weight based adjustment of the mA/kV was utilized to reduce the radiation dose to as low as reasonably achievable. COMPARISON: None HISTORY: ORDERING SYSTEM PROVIDED HISTORY: syncope, elevated ddimer TECHNOLOGIST PROVIDED HISTORY: Reason for exam:->syncope, elevated ddimer Additional Contrast?->1 Reason for Exam: LOC, fall, elevated d dimer FINDINGS: Pulmonary Arteries: Pulmonary arteries are adequately opacified for evaluation.  No evidence of intraluminal filling defect to suggest pulmonary embolism.  Main pulmonary artery is normal in caliber. Mediastinum: No evidence of mediastinal lymphadenopathy.  The heart and pericardium demonstrate no acute abnormality.  There is no acute abnormality of the thoracic aorta. Lungs/pleura: There is some minimal ground-glass opacities scattered along the right upper lobe there is a tiny 2-3 mm nodule along the right upper lobe laterally there is a 5 mm subpleural nodule right lower lobe superiorly near the fissure there is mild subpleural linear ground-glass opacity right

## 2024-11-25 NOTE — PROGRESS NOTES
11/25/24 0000   RT Protocol   History Pulmonary Disease 2   Respiratory pattern 0   Breath sounds 0   Cough 0   Indications for Bronchodilator Therapy None   Bronchodilator Assessment Score 2     RT Inhaler-Nebulizer Bronchodilator Protocol Note    There is a bronchodilator order in the chart from a provider indicating to follow the RT Bronchodilator Protocol and there is an “Initiate RT Inhaler-Nebulizer Bronchodilator Protocol” order as well (see protocol at bottom of note).    CXR Findings:  No results found.    The findings from the last RT Protocol Assessment were as follows:   History Pulmonary Disease: Chronic pulmonary disease  Respiratory Pattern: Regular pattern and RR 12-20 bpm  Breath Sounds: Clear breath sounds  Cough: Strong, spontaneous, non-productive  Indication for Bronchodilator Therapy: None  Bronchodilator Assessment Score: 2    Aerosolized bronchodilator medication orders have been revised according to the RT Inhaler-Nebulizer Bronchodilator Protocol below.    Respiratory Therapist to perform RT Therapy Protocol Assessment initially then follow the protocol.  Repeat RT Therapy Protocol Assessment PRN for score 0-3 or on second treatment, BID, and PRN for scores above 3.    No Indications - adjust the frequency to every 6 hours PRN wheezing or bronchospasm, if no treatments needed after 48 hours then discontinue using Per Protocol order mode.     If indication present, adjust the RT bronchodilator orders based on the Bronchodilator Assessment Score as indicated below.  Use Inhaler orders unless patient has one or more of the following: on home nebulizer, not able to hold breath for 10 seconds, is not alert and oriented, cannot activate and use MDI correctly, or respiratory rate 25 breaths per minute or more, then use the equivalent nebulizer order(s) with same Frequency and PRN reasons based on the score.  If a patient is on this medication at home then do not decrease Frequency below that  used at home.    0-3 - enter or revise RT bronchodilator order(s) to equivalent RT Bronchodilator order with Frequency of every 4 hours PRN for wheezing or increased work of breathing using Per Protocol order mode.        4-6 - enter or revise RT Bronchodilator order(s) to two equivalent RT bronchodilator orders with one order with BID Frequency and one order with Frequency of every 4 hours PRN wheezing or increased work of breathing using Per Protocol order mode.        7-10 - enter or revise RT Bronchodilator order(s) to two equivalent RT bronchodilator orders with one order with TID Frequency and one order with Frequency of every 4 hours PRN wheezing or increased work of breathing using Per Protocol order mode.       11-13 - enter or revise RT Bronchodilator order(s) to one equivalent RT bronchodilator order with QID Frequency and an Albuterol order with Frequency of every 4 hours PRN wheezing or increased work of breathing using Per Protocol order mode.      Greater than 13 - enter or revise RT Bronchodilator order(s) to one equivalent RT bronchodilator order with every 4 hours Frequency and an Albuterol order with Frequency of every 2 hours PRN wheezing or increased work of breathing using Per Protocol order mode.     RT to enter RT Home Evaluation for COPD & MDI Assessment order using Per Protocol order mode.    Electronically signed by Keegan Castro RCP on 11/25/2024 at 12:19 AM

## 2024-11-25 NOTE — CONSULTS
Reason for referral and CC: lung nodules    HISTORY OF PRESENT ILLNESS: 54-year-old male with a history of tobacco abuse and diabetes and COPD presented after a fall at home.  It sounds like he generally has been having a failure to thrive and has been becoming more weak.  He fell at home has significant bruising and elevated CK.  Chest CT suggested mild early pneumonia.  Also showed 2 very small lung nodules.  Patient's family notes he has had a chronically elevated white blood cell count      Past Medical History:   Diagnosis Date    COPD (chronic obstructive pulmonary disease) (HCC)     Depression     Diabetes mellitus (HCC)     Gastroparesis     GERD (gastroesophageal reflux disease)     Hyperlipidemia     Hypertension     Stable angina (HCC)     Wears dentures      Past Surgical History:   Procedure Laterality Date    CHOLECYSTECTOMY      COLONOSCOPY      ESOPHAGOGASTRODUODENOSCOPY      LUMBAR SPINE SURGERY N/A 3/24/2023    MICROLUMBAR DISCECTOMY LUMBAR 4 - LUMBAR 5           **LATEX SENSITIVE** performed by Masood Carrillo MD at [facility] OR    TONSILLECTOMY       Family History  family history is not on file.    Social History:  reports that he has been smoking cigarettes. He started smoking about 20 months ago. He has a 3.4 pack-year smoking history. He has never used smokeless tobacco.   reports that he does not currently use alcohol.    ALLERGIES:  Patient is allergic to latex.  Continuous Infusions:   sodium chloride Stopped (11/24/24 1913)    sodium chloride 1,000 mL (11/24/24 1945)    sodium chloride      sodium chloride 125 mL/hr at 11/24/24 2240    dextrose       Scheduled Meds:   nicotine  1 patch TransDERmal Daily    nicotine  1 patch TransDERmal Once    cefTRIAXone (ROCEPHIN) IV  1,000 mg IntraVENous Q24H    [Held by provider] docusate sodium  100 mg Oral Daily    glipiZIDE  10 mg Oral QAM AC    insulin glargine  30 Units SubCUTAneous BID    [Held by provider] pravastatin  80 mg Oral Daily    sucralfate

## 2024-11-25 NOTE — ACP (ADVANCE CARE PLANNING)
Advance Care Planning     General Advance Care Planning (ACP) Conversation    Date of Conversation: 11/25/2024  Conducted with: Patient with Decision Making Capacity  Other persons present: None    Healthcare Decision Maker: No healthcare decision makers have been documented.       Content/Action Overview:  DECLINED ACP Conversation - will revisit periodically  Reviewed DNR/DNI and patient elects Full Code (Attempt Resuscitation)        Length of Voluntary ACP Conversation in minutes:  <16 minutes (Non-Billable)    Corry De Guzman RN

## 2024-11-25 NOTE — PROGRESS NOTES
Consult has been called to Dr. ludwig on 11/25/24. Spoke with maryam . 8:02 AM    Dominique Hoffman  11/25/2024

## 2024-11-25 NOTE — PROGRESS NOTES
11/25/24 0000   RT Protocol   History Pulmonary Disease 2   Respiratory pattern 0   Breath sounds 0   Cough 0   Indications for Bronchodilator Therapy None   Bronchodilator Assessment Score 2     RT Inhaler-Nebulizer Bronchodilator Protocol Note    There is a bronchodilator order in the chart from a provider indicating to follow the RT Bronchodilator Protocol and there is an “Initiate RT Inhaler-Nebulizer Bronchodilator Protocol” order as well (see protocol at bottom of note).    CXR Findings:  No results found.    The findings from the last RT Protocol Assessment were as follows:   History Pulmonary Disease: Chronic pulmonary disease  Respiratory Pattern: Regular pattern and RR 12-20 bpm  Breath Sounds: Clear breath sounds  Cough: Strong, spontaneous, non-productive  Indication for Bronchodilator Therapy: None  Bronchodilator Assessment Score: 2    Aerosolized bronchodilator medication orders have been revised according to the RT Inhaler-Nebulizer Bronchodilator Protocol below.    Respiratory Therapist to perform RT Therapy Protocol Assessment initially then follow the protocol.  Repeat RT Therapy Protocol Assessment PRN for score 0-3 or on second treatment, BID, and PRN for scores above 3.    No Indications - adjust the frequency to every 6 hours PRN wheezing or bronchospasm, if no treatments needed after 48 hours then discontinue using Per Protocol order mode.     If indication present, adjust the RT bronchodilator orders based on the Bronchodilator Assessment Score as indicated below.  Use Inhaler orders unless patient has one or more of the following: on home nebulizer, not able to hold breath for 10 seconds, is not alert and oriented, cannot activate and use MDI correctly, or respiratory rate 25 breaths per minute or more, then use the equivalent nebulizer order(s) with same Frequency and PRN reasons based on the score.  If a patient is on this medication at home then do not decrease Frequency below that

## 2024-11-25 NOTE — PROGRESS NOTES
Progress Note    Admit Date:  11/24/2024    Subjective:  Mr. Sykes was admitted to the hospital after he had a fall.  Patient had a workup in the ED as he had pain all over the body and some swelling of the right side of the face.  CT neck and CT of the maxillofacial bones showed no fractures.  Workup also showed that he had a cough.  Workup consistent with sepsis.  This morning he is feeling some better.  He still sore all over.  No chest pain.  No cough.  He is a smoker.  No diagnosis of COPD.    Objective:   BP (!) 147/81   Pulse 90   Temp 98.1 °F (36.7 °C) (Oral)   Resp 16   Ht 1.651 m (5' 5\")   Wt 92.9 kg (204 lb 11.2 oz)   SpO2 94%   BMI 34.06 kg/m²      No intake or output data in the 24 hours ending 11/25/24 0736    Physical Exam:    General appearance: He is awake and alert.  Ill-appearing.  Swelling of the right side of face with tenderness  Head: Normocephalic, without obvious abnormality, atraumatic  Eyes: conjunctivae/corneas clear. PERRL, EOM's intact.  Neck: no adenopathy, no carotid bruit, no JVD, supple, symmetrical, trachea midline and thyroid not enlarged, symmetric, no tenderness/mass/nodules  Lungs: No wheezes or rhonchi.  Few bibasilar crackles present.  No respiratory distress or accessory muscle use.  Heart: regular rate and rhythm, S1, S2 normal, no murmur, click, rub or gallop  Abdomen: soft, non-tender; bowel sounds normal; no masses,  no organomegaly  Extremities: extremities normal, atraumatic, no cyanosis or edema  Pulses: 2+ and symmetric  Skin: Skin color, texture, turgor normal. No rashes or lesions  Neurologic: Grossly normal    Scheduled Meds:   nicotine  1 patch TransDERmal Once    cefTRIAXone (ROCEPHIN) IV  1,000 mg IntraVENous Q24H    [Held by provider] docusate sodium  100 mg Oral Daily    glipiZIDE  10 mg Oral QAM AC    insulin glargine  30 Units SubCUTAneous BID    pravastatin  80 mg Oral Daily    sucralfate  1 g Oral 4x Daily WC    [Held by provider] verapamil  240 mg

## 2024-11-25 NOTE — PROGRESS NOTES
Inpatient Occupational Therapy Evaluation & Treatment    Unit: 2 [place]  Date:  11/25/2024  Patient Name:    Terell Sykes  Admitting diagnosis:  Lung nodule [R91.1]  Periumbilical hernia [K42.9]  Elevated troponin [R79.89]  Elevated brain natriuretic peptide (BNP) level [R79.89]  Fall, initial encounter [W19.XXXA]  Sepsis (HCC) [A41.9]  Severe sepsis (HCC) [A41.9, R65.20]  Traumatic rhabdomyolysis, initial encounter ([facility]) [T79.6XXA]  Constipation, unspecified constipation type [K59.00]  Inguinal hernia without obstruction or gangrene, recurrence not specified, unspecified laterality [K40.90]  Community acquired pneumonia, unspecified laterality [J18.9]  Admit Date:  11/24/2024  Precautions/Restrictions/WB Status/ Lines/ Wounds/ Oxygen: Fall risk, Bed/chair alarm, Lines (IV, Supplemental O2 (2L), and external catheter), Telemetry, and Isolation Precautions: Droplet Plus - COVID R/O     Pt seen for cotreatment this date due to patient safety, patient endurance, acute illness/injury, and limited functional status information    Treatment Time:  245-1277  Treatment Number:  1  Timed Code Treatment Minutes: 38 minutes  Total Treatment Minutes:  48  minutes    Patient Goals for Therapy: none stated          Discharge Recommendations: Home with PRN assist, Home with initial 24/7 supervision, and Home OT  DME needs for discharge: RW       Therapy recommendations for staff:   Assist of 1 for transfers with use of rolling walker (RW) and gait belt to/from BSC  to/from chair  to/from bathroom    History of Present Illness: Per Dr. Ordoñez H&P 11/24/24:     \"54 y.o. male with past medical of hypertension, hyperlipidemia, anxiety, depression, tobacco use, type 2 diabetes presented to emergency department complaint of fall, dyspnea.  Patient today and was walking to the kitchen to get a glass of milk and his legs felt extremely weak and he fell.  Patient states that he was on became home patient does not have history of falls.   pain     BP (mmHg) HR (bpm) SpO2 (%) on 2L Comments   Supine at rest 112/61 80 93    Seated at EOB       Standing       End of session         Objective:  Does this pt have an acute or acute on chronic diagnosis of CHF? No    Upper Extremity ROM:    AROM WFL: No  UE ROM limitations: B shoulder flexion limited to approx 80 degrees AROM    Dominant Hand: Right    Upper Extremity Strength:    Formal strength testing deferred due to pain in B UEs and BUE strength impaired but not formally assessed w/ MMT      Upper Extremity Sensation:    NT    Coordination:  WFL    Tone:  Not Tested    Balance:  Sitting:    SBA static and dynamic at EOB and in recliner  Standing:    CGA static and dynamic with BUE support on RW    Bed Mobility:   Supine to Sit:    Min A   Sit to Supine:   Not Tested  Rolling:   Not Tested  Scooting in sitting: SBA  Scooting in supine:  Not Tested    Transfers:    Sit to stand:    CGA  Stand to sit:    CGA  Bed to chair:     CGA and With RW and gait belt; no LOB noted, but pt reporting pain  Bed/ chair to standard toilet:  Not Tested  Bed/chair to BSC:   Not Tested  Functional Mobility:   Not Tested    ADLs:  Dressing:      UE:   Not Tested  LE:    SBA to don/doff socks; reports primarily wears slip on shoes at home    Bathing:    UE:  Not Tested  LE:  Not Tested    Eating:   Not Tested    Toileting:  Not Tested    Grooming/hygiene: Supervision hair combing, oral care, wiping face, all while seated in recliner    Ther Ex / Activities Initiated:   N/A    Positioning Needs:   Pt up in chair and alarm set  Call light provided and all needs within reach    Patient/Family Education:   Pt educated on role of inpatient OT, plan of care, importance of continued activity, DC recommendations, functional transfer/mobility safety, transfer techniques, and calling for assist with mobility    CHF Education  N/A    Assessment:  Pt seen for occupational therapy evaluation in the acute care setting this date.  Pt

## 2024-11-26 ENCOUNTER — TELEPHONE (OUTPATIENT)
Dept: PULMONOLOGY | Age: 54
End: 2024-11-26

## 2024-11-26 ENCOUNTER — TELEPHONE (OUTPATIENT)
Dept: CARDIOLOGY CLINIC | Age: 54
End: 2024-11-26

## 2024-11-26 ENCOUNTER — APPOINTMENT (OUTPATIENT)
Age: 54
DRG: 720 | End: 2024-11-26
Attending: INTERNAL MEDICINE
Payer: MEDICAID

## 2024-11-26 ENCOUNTER — ANCILLARY PROCEDURE (OUTPATIENT)
Dept: CARDIOLOGY CLINIC | Age: 54
End: 2024-11-26
Payer: MEDICAID

## 2024-11-26 VITALS
WEIGHT: 204 LBS | HEIGHT: 65 IN | OXYGEN SATURATION: 94 % | TEMPERATURE: 98.1 F | BODY MASS INDEX: 33.99 KG/M2 | SYSTOLIC BLOOD PRESSURE: 156 MMHG | RESPIRATION RATE: 18 BRPM | HEART RATE: 90 BPM | DIASTOLIC BLOOD PRESSURE: 75 MMHG

## 2024-11-26 DIAGNOSIS — R55 SYNCOPE, UNSPECIFIED SYNCOPE TYPE: Primary | ICD-10-CM

## 2024-11-26 DIAGNOSIS — R55 SYNCOPE, UNSPECIFIED SYNCOPE TYPE: ICD-10-CM

## 2024-11-26 PROBLEM — R29.818 TRANSIENT NEUROLOGICAL SYMPTOMS: Status: ACTIVE | Noted: 2024-11-26

## 2024-11-26 PROBLEM — R79.89 ELEVATED TROPONIN: Status: ACTIVE | Noted: 2024-11-26

## 2024-11-26 PROBLEM — I25.10 CORONARY ARTERY DISEASE INVOLVING NATIVE CORONARY ARTERY OF NATIVE HEART WITHOUT ANGINA PECTORIS: Status: ACTIVE | Noted: 2024-11-26

## 2024-11-26 PROBLEM — R53.1 WEAKNESS: Status: ACTIVE | Noted: 2024-11-26

## 2024-11-26 LAB
ALBUMIN SERPL-MCNC: 3 G/DL (ref 3.4–5)
ALBUMIN/GLOB SERPL: 1.3 {RATIO} (ref 1.1–2.2)
ALP SERPL-CCNC: 71 U/L (ref 40–129)
ALT SERPL-CCNC: 56 U/L (ref 10–40)
ANION GAP SERPL CALCULATED.3IONS-SCNC: 11 MMOL/L (ref 3–16)
ANISOCYTOSIS BLD QL SMEAR: ABNORMAL
AST SERPL-CCNC: 120 U/L (ref 15–37)
BASOPHILS # BLD: 0 K/UL (ref 0–0.2)
BASOPHILS NFR BLD: 0 %
BILIRUB SERPL-MCNC: 0.3 MG/DL (ref 0–1)
BUN SERPL-MCNC: 18 MG/DL (ref 7–20)
CALCIUM SERPL-MCNC: 8 MG/DL (ref 8.3–10.6)
CHLORIDE SERPL-SCNC: 104 MMOL/L (ref 99–110)
CK SERPL-CCNC: 1866 U/L (ref 39–308)
CO2 SERPL-SCNC: 23 MMOL/L (ref 21–32)
CREAT SERPL-MCNC: 0.6 MG/DL (ref 0.9–1.3)
DEPRECATED RDW RBC AUTO: 13.1 % (ref 12.4–15.4)
ECHO AO ROOT DIAM: 3.7 CM
ECHO AO ROOT INDEX: 1.86 CM/M2
ECHO AV MEAN GRADIENT: 6 MMHG
ECHO AV MEAN GRADIENT: 6 MMHG
ECHO AV MEAN VELOCITY: 1.2 M/S
ECHO AV PEAK GRADIENT: 11 MMHG
ECHO AV PEAK VELOCITY: 1.7 M/S
ECHO AV VELOCITY RATIO: 0.76
ECHO AV VTI: 35.3 CM
ECHO BSA: 2.06 M2
ECHO IVC EXP: 1.8 CM
ECHO IVC INSP: 0.6 CM
ECHO LA AREA 2C: 21.7 CM2
ECHO LA AREA 4C: 19.2 CM2
ECHO LA MAJOR AXIS: 5.4 CM
ECHO LA MINOR AXIS: 5.9 CM
ECHO LA VOL BP: 62 ML (ref 18–58)
ECHO LA VOL MOD A2C: 65 ML (ref 18–58)
ECHO LA VOL MOD A4C: 55 ML (ref 18–58)
ECHO LA VOL/BSA BIPLANE: 31 ML/M2 (ref 16–34)
ECHO LA VOLUME INDEX MOD A2C: 33 ML/M2 (ref 16–34)
ECHO LA VOLUME INDEX MOD A4C: 28 ML/M2 (ref 16–34)
ECHO LV E' LATERAL VELOCITY: 9.46 CM/S
ECHO LV E' SEPTAL VELOCITY: 8.59 CM/S
ECHO LV EF PHYSICIAN: 60 %
ECHO LV FRACTIONAL SHORTENING: 45 % (ref 28–44)
ECHO LV INTERNAL DIMENSION DIASTOLE INDEX: 2.36 CM/M2
ECHO LV INTERNAL DIMENSION DIASTOLIC: 4.7 CM (ref 4.2–5.9)
ECHO LV INTERNAL DIMENSION SYSTOLIC INDEX: 1.31 CM/M2
ECHO LV INTERNAL DIMENSION SYSTOLIC: 2.6 CM
ECHO LV ISOVOLUMETRIC RELAXATION TIME (IVRT): 78 MS
ECHO LV IVSD: 1.2 CM (ref 0.6–1)
ECHO LV MASS 2D: 224.8 G (ref 88–224)
ECHO LV MASS INDEX 2D: 112.9 G/M2 (ref 49–115)
ECHO LV POSTERIOR WALL DIASTOLIC: 1.3 CM (ref 0.6–1)
ECHO LV RELATIVE WALL THICKNESS RATIO: 0.55
ECHO LVOT AV VTI INDEX: 0.67
ECHO LVOT MEAN GRADIENT: 3 MMHG
ECHO LVOT PEAK GRADIENT: 7 MMHG
ECHO LVOT PEAK VELOCITY: 1.3 M/S
ECHO LVOT VTI: 23.7 CM
ECHO MV A VELOCITY: 1.01 M/S
ECHO MV E DECELERATION TIME (DT): 219 MS
ECHO MV E VELOCITY: 1.01 M/S
ECHO MV E/A RATIO: 1
ECHO MV E/E' LATERAL: 10.68
ECHO MV E/E' RATIO (AVERAGED): 11.22
ECHO MV E/E' SEPTAL: 11.76
ECHO MV LVOT VTI INDEX: 1.19
ECHO MV MAX VELOCITY: 1.1 M/S
ECHO MV MEAN GRADIENT: 2 MMHG
ECHO MV MEAN VELOCITY: 0.7 M/S
ECHO MV PEAK GRADIENT: 5 MMHG
ECHO MV VTI: 28.2 CM
ECHO PV MAX VELOCITY: 1.1 M/S
ECHO PV MEAN GRADIENT: 3 MMHG
ECHO PV MEAN VELOCITY: 0.8 M/S
ECHO PV PEAK GRADIENT: 5 MMHG
ECHO PV VTI: 20.9 CM
ECHO RV BASAL DIMENSION: 3.7 CM
ECHO RV FREE WALL PEAK S': 13.4 CM/S
ECHO RV LONGITUDINAL DIMENSION: 6.8 CM
ECHO RV MID DIMENSION: 3.2 CM
ECHO RV TAPSE: 2.3 CM (ref 1.7–?)
EOSINOPHIL # BLD: 0 K/UL (ref 0–0.6)
EOSINOPHIL NFR BLD: 0 %
GFR SERPLBLD CREATININE-BSD FMLA CKD-EPI: >90 ML/MIN/{1.73_M2}
GLUCOSE BLD-MCNC: 109 MG/DL (ref 70–99)
GLUCOSE BLD-MCNC: 145 MG/DL (ref 70–99)
GLUCOSE BLD-MCNC: 97 MG/DL (ref 70–99)
GLUCOSE SERPL-MCNC: 97 MG/DL (ref 70–99)
HCT VFR BLD AUTO: 38 % (ref 40.5–52.5)
HGB BLD-MCNC: 12.8 G/DL (ref 13.5–17.5)
LYMPHOCYTES # BLD: 3 K/UL (ref 1–5.1)
LYMPHOCYTES NFR BLD: 10 %
MCH RBC QN AUTO: 31.8 PG (ref 26–34)
MCHC RBC AUTO-ENTMCNC: 33.7 G/DL (ref 31–36)
MCV RBC AUTO: 94.3 FL (ref 80–100)
MONOCYTES # BLD: 0.7 K/UL (ref 0–1.3)
MONOCYTES NFR BLD: 4 %
NEUTROPHILS # BLD: 14.8 K/UL (ref 1.7–7.7)
NEUTROPHILS NFR BLD: 77 %
NEUTS BAND NFR BLD MANUAL: 3 % (ref 0–7)
PERFORMED ON: ABNORMAL
PERFORMED ON: ABNORMAL
PERFORMED ON: NORMAL
PLATELET # BLD AUTO: 223 K/UL (ref 135–450)
PLATELET BLD QL SMEAR: ADEQUATE
PMV BLD AUTO: 8.7 FL (ref 5–10.5)
POTASSIUM SERPL-SCNC: 3.6 MMOL/L (ref 3.5–5.1)
PROT SERPL-MCNC: 5.4 G/DL (ref 6.4–8.2)
RBC # BLD AUTO: 4.03 M/UL (ref 4.2–5.9)
SLIDE REVIEW: ABNORMAL
SODIUM SERPL-SCNC: 138 MMOL/L (ref 136–145)
VARIANT LYMPHS NFR BLD MANUAL: 6 % (ref 0–6)
WBC # BLD AUTO: 18.5 K/UL (ref 4–11)

## 2024-11-26 PROCEDURE — 99222 1ST HOSP IP/OBS MODERATE 55: CPT | Performed by: STUDENT IN AN ORGANIZED HEALTH CARE EDUCATION/TRAINING PROGRAM

## 2024-11-26 PROCEDURE — 93306 TTE W/DOPPLER COMPLETE: CPT

## 2024-11-26 PROCEDURE — 6360000002 HC RX W HCPCS: Performed by: STUDENT IN AN ORGANIZED HEALTH CARE EDUCATION/TRAINING PROGRAM

## 2024-11-26 PROCEDURE — 6370000000 HC RX 637 (ALT 250 FOR IP): Performed by: NURSE PRACTITIONER

## 2024-11-26 PROCEDURE — 82550 ASSAY OF CK (CPK): CPT

## 2024-11-26 PROCEDURE — 85025 COMPLETE CBC W/AUTO DIFF WBC: CPT

## 2024-11-26 PROCEDURE — 36415 COLL VENOUS BLD VENIPUNCTURE: CPT

## 2024-11-26 PROCEDURE — 99233 SBSQ HOSP IP/OBS HIGH 50: CPT | Performed by: INTERNAL MEDICINE

## 2024-11-26 PROCEDURE — 6370000000 HC RX 637 (ALT 250 FOR IP): Performed by: STUDENT IN AN ORGANIZED HEALTH CARE EDUCATION/TRAINING PROGRAM

## 2024-11-26 PROCEDURE — 93270 REMOTE 30 DAY ECG REV/REPORT: CPT | Performed by: INTERNAL MEDICINE

## 2024-11-26 PROCEDURE — 99239 HOSP IP/OBS DSCHRG MGMT >30: CPT | Performed by: INTERNAL MEDICINE

## 2024-11-26 PROCEDURE — 95816 EEG AWAKE AND DROWSY: CPT | Performed by: PSYCHIATRY & NEUROLOGY

## 2024-11-26 PROCEDURE — 80053 COMPREHEN METABOLIC PANEL: CPT

## 2024-11-26 PROCEDURE — 95816 EEG AWAKE AND DROWSY: CPT

## 2024-11-26 PROCEDURE — 93306 TTE W/DOPPLER COMPLETE: CPT | Performed by: STUDENT IN AN ORGANIZED HEALTH CARE EDUCATION/TRAINING PROGRAM

## 2024-11-26 PROCEDURE — 2580000003 HC RX 258: Performed by: STUDENT IN AN ORGANIZED HEALTH CARE EDUCATION/TRAINING PROGRAM

## 2024-11-26 PROCEDURE — 6370000000 HC RX 637 (ALT 250 FOR IP): Performed by: INTERNAL MEDICINE

## 2024-11-26 PROCEDURE — 99406 BEHAV CHNG SMOKING 3-10 MIN: CPT | Performed by: INTERNAL MEDICINE

## 2024-11-26 RX ORDER — ALPRAZOLAM 0.5 MG
0.5 TABLET ORAL
Qty: 1 TABLET | Refills: 0 | Status: SHIPPED | OUTPATIENT
Start: 2024-11-26 | End: 2025-05-30

## 2024-11-26 RX ORDER — LEVOFLOXACIN 750 MG/1
750 TABLET, FILM COATED ORAL DAILY
Status: DISCONTINUED | OUTPATIENT
Start: 2024-11-26 | End: 2024-11-26 | Stop reason: HOSPADM

## 2024-11-26 RX ORDER — IBUPROFEN 600 MG/1
600 TABLET, FILM COATED ORAL 3 TIMES DAILY PRN
Status: DISCONTINUED | OUTPATIENT
Start: 2024-11-26 | End: 2024-11-26 | Stop reason: HOSPADM

## 2024-11-26 RX ORDER — ASPIRIN 81 MG/1
81 TABLET ORAL DAILY
Qty: 30 TABLET | Refills: 3 | Status: SHIPPED | OUTPATIENT
Start: 2024-11-27

## 2024-11-26 RX ORDER — ASPIRIN 81 MG/1
81 TABLET ORAL DAILY
Status: DISCONTINUED | OUTPATIENT
Start: 2024-11-26 | End: 2024-11-26 | Stop reason: HOSPADM

## 2024-11-26 RX ORDER — LEVOFLOXACIN 750 MG/1
750 TABLET, FILM COATED ORAL DAILY
Qty: 4 TABLET | Refills: 0 | Status: SHIPPED | OUTPATIENT
Start: 2024-11-27 | End: 2024-12-01

## 2024-11-26 RX ADMIN — SUCRALFATE 1 G: 1 TABLET ORAL at 12:12

## 2024-11-26 RX ADMIN — GUAIFENESIN 1200 MG: 600 TABLET, EXTENDED RELEASE ORAL at 08:32

## 2024-11-26 RX ADMIN — SUCRALFATE 1 G: 1 TABLET ORAL at 08:32

## 2024-11-26 RX ADMIN — CARIPRAZINE 3 MG: 3 CAPSULE, GELATIN COATED ORAL at 13:17

## 2024-11-26 RX ADMIN — HYDROMORPHONE HYDROCHLORIDE 1 MG: 1 INJECTION, SOLUTION INTRAMUSCULAR; INTRAVENOUS; SUBCUTANEOUS at 03:33

## 2024-11-26 RX ADMIN — INSULIN GLARGINE 30 UNITS: 100 INJECTION, SOLUTION SUBCUTANEOUS at 08:32

## 2024-11-26 RX ADMIN — ASPIRIN 81 MG: 81 TABLET, COATED ORAL at 17:10

## 2024-11-26 RX ADMIN — ACETAMINOPHEN 650 MG: 325 TABLET ORAL at 03:00

## 2024-11-26 RX ADMIN — GLIPIZIDE 10 MG: 5 TABLET ORAL at 05:46

## 2024-11-26 RX ADMIN — IBUPROFEN 600 MG: 600 TABLET, FILM COATED ORAL at 12:12

## 2024-11-26 RX ADMIN — ACETAMINOPHEN 650 MG: 325 TABLET ORAL at 08:32

## 2024-11-26 RX ADMIN — VERAPAMIL HYDROCHLORIDE 120 MG: 120 TABLET, FILM COATED, EXTENDED RELEASE ORAL at 08:41

## 2024-11-26 RX ADMIN — NICOTINE POLACRILEX 2 MG: 2 LOZENGE ORAL at 08:32

## 2024-11-26 RX ADMIN — SUCRALFATE 1 G: 1 TABLET ORAL at 17:10

## 2024-11-26 RX ADMIN — LEVOFLOXACIN 750 MG: 750 TABLET, FILM COATED ORAL at 08:41

## 2024-11-26 RX ADMIN — SODIUM CHLORIDE, PRESERVATIVE FREE 10 ML: 5 INJECTION INTRAVENOUS at 08:42

## 2024-11-26 RX ADMIN — NICOTINE POLACRILEX 2 MG: 2 LOZENGE ORAL at 12:13

## 2024-11-26 ASSESSMENT — PAIN DESCRIPTION - DESCRIPTORS
DESCRIPTORS: ACHING

## 2024-11-26 ASSESSMENT — PAIN SCALES - GENERAL
PAINLEVEL_OUTOF10: 9
PAINLEVEL_OUTOF10: 0
PAINLEVEL_OUTOF10: 6
PAINLEVEL_OUTOF10: 6
PAINLEVEL_OUTOF10: 9
PAINLEVEL_OUTOF10: 4

## 2024-11-26 ASSESSMENT — PAIN DESCRIPTION - LOCATION
LOCATION: FACE

## 2024-11-26 ASSESSMENT — PAIN - FUNCTIONAL ASSESSMENT
PAIN_FUNCTIONAL_ASSESSMENT: PREVENTS OR INTERFERES SOME ACTIVE ACTIVITIES AND ADLS
PAIN_FUNCTIONAL_ASSESSMENT: ACTIVITIES ARE NOT PREVENTED

## 2024-11-26 ASSESSMENT — PAIN DESCRIPTION - FREQUENCY
FREQUENCY: CONTINUOUS
FREQUENCY: INTERMITTENT
FREQUENCY: CONTINUOUS

## 2024-11-26 ASSESSMENT — PAIN DESCRIPTION - PAIN TYPE
TYPE: ACUTE PAIN

## 2024-11-26 ASSESSMENT — PAIN DESCRIPTION - ORIENTATION
ORIENTATION: RIGHT

## 2024-11-26 ASSESSMENT — PAIN DESCRIPTION - DIRECTION: RADIATING_TOWARDS: NON

## 2024-11-26 ASSESSMENT — PAIN DESCRIPTION - ONSET
ONSET: SUDDEN
ONSET: ON-GOING
ONSET: ON-GOING

## 2024-11-26 NOTE — PROGRESS NOTES
Consult has been called to Dr. pressley on 11/26/24. Spoke with elizabeth. 8:41 AM    Dominique Hoffman  11/26/2024

## 2024-11-26 NOTE — TELEPHONE ENCOUNTER
Portal message sent to patient to call us when he gets out of the hospital to schedule a ct and follow up in one yr

## 2024-11-26 NOTE — PROGRESS NOTES
BP (!) 156/75   Pulse 92   Temp 97.3 °F (36.3 °C) (Oral)   Resp 18   Ht 1.651 m (5' 5\")   Wt 92.9 kg (204 lb 11.2 oz)   SpO2 91%   BMI 34.06 kg/m²     Assessment complete. Meds passed. Pt denies needs at this time. Uses cane at baseline. Pleasant. Wants to go home today. EEG test done, waiting for ECHO. Patient hopeful to go home today. On PO levaquin. PRN tylenol given for face pain. Ambulating to bathroom well with assistance. States he feels better overall.         Bedside Mobility Assessment Tool (BMAT):     Assessment Level 1- Sit and Shake    1. From a semi-reclined position, ask patient to sit up and rotate to a seated position at the side of the bed. Can use the bedrail.    2. Ask patient to reach out and grab your hand and shake making sure patient reaches across his/her midline.   Pass- Patient is able to come to a seated position, maintain core strength. Maintains seated balance while reaching across midline. Move on to Assessment Level 2.     Assessment Level 2- Stretch and Point   1. With patient in seated position at the side of the bed, have patient place both feet on the floor (or stool) with knees no higher than hips.    2. Ask patient to stretch one leg and straighten the knee, then bend the ankle/flex and point the toes. If appropriate, repeat with the other leg.   Pass- Patient is able to demonstrate appropriate quad strength on intended weight bearing limb(s). Move onto Assessment Level 3.     Assessment Level 3- Stand   1. Ask patient to elevate off the bed or chair (seated to standing) using an assistive device (cane, bedrail).    2. Patient should be able to raise buttocks off be and hold for a count of five. May repeat once.   Pass- Patient maintains standing stability for at least 5 seconds, proceed to assessment level 4.    Assessment Level 4- Walk   1. Ask patient to march in place at bedside.    2. Then ask patient to advance step and return each foot. Some medical conditions may  render a patient from stepping backwards, use your best clinical judgement.   Fail- Patient not able to complete tasks OR requires use of assistive device. Patient is MOBILITY LEVEL 3.       Mobility Level- 3

## 2024-11-26 NOTE — TELEPHONE ENCOUNTER
Monitor placed by SUZANNE Knight, RN  Monitor company [employer]  Length of monitor 30 day  Monitor ordered by Ailin  Patch ID [id number]  Patch ID [id number]  Patch ID [id number]  Patch ID [id number]  Phone ID [id number]  Activation successful prior to pt leaving office? Yes

## 2024-11-26 NOTE — CONSULTS
Inpatient Neurology consult        [facility] Neurology            Terell Sykes  1970    Date of Service: 11/26/2024    Referring Physician: Julee Kumari DO      Reason for the consult and CC: LOC with incontinence     HPI:   The patient is a 54 y.o. male with a past medical history of COPD, depression, chronic back pain, diabetes, gastroparesis, GERD, hyperlipidemia, and hypertension originally presenting to the hospital following a fall at home. Patient states he stood up from the couch and was walking to get a drink when \"his back gave out\" and describes the event as a \"back spasm\" and he fell to the ground hitting his head against the coffee table with the right side of his face. He is unsure if he lost consciousness but states he was able to immediately call his sister for assistance with is Juliann following the fall. Patient states he was unable to pick himself up from the floor and laid there for approximately 4 hours while waiting for assistance. Patient states he did urinate himself following the fall but states he was not incontinent and had the urge to urinate but was unable to get to the bathroom in time. Patient states he has chronic back problems and was seeing a pain specialist at one point but quit going about 1 year ago because he felt that he was not benefiting from treatment and the pain medications made him feel \"like his head was in a cloud\". Patient reports 2-3 back spasms per week but denies any other falls prior to this fall. He denies any precipitating symptoms to fall and states he remembers falling and hitting his head on the table. Patient is unsure how long he was on the ground but states his sister told him it had been around 4 hours. Neurology has been consulted for further evaluation and management of fall with questionable loss of consciousness and incontinence.         Constitutional:   Vitals:    11/25/24 2345 11/26/24 0306 11/26/24 0403 11/26/24 0830   BP: (!) 146/72 (!)  neurology clinic 3 months.   Neurology will sign off but please contact me if there are additional questions/issues.    Electronically signed by Jeremías Perez MD on 11/26/2024 at 3:11 PM

## 2024-11-26 NOTE — PROGRESS NOTES
Pulmonary Progress Note  CC: pneumonia    Subjective:  pt receiving an eeg      EXAM: BP (!) 156/74   Pulse 84   Temp 98.3 °F (36.8 °C) (Oral)   Resp 16   Ht 1.651 m (5' 5\")   Wt 92.9 kg (204 lb 11.2 oz)   SpO2 92%   BMI 34.06 kg/m²  on RA  Constitutional:  No acute distress.   Eyes: PERRL. Conjunctivae anicteric.   ENT: Normal nose. Normal tongue.    Neck:  Trachea is midline.   Respiratory: No accessory muscle usage.   decreased breath sounds. No wheezes. No rales. No Rhonchi.  Cardiovascular: Normal S1S2. No digit clubbing. No digit cyanosis. No LE edema.   Psychiatric: No anxiety or Agitation. Alert and Oriented to person, place and time.    Scheduled Meds:   levoFLOXacin  750 mg Oral Daily    nicotine  1 patch TransDERmal Daily    verapamil  120 mg Oral BID    [Held by provider] docusate sodium  100 mg Oral Daily    glipiZIDE  10 mg Oral QAM AC    insulin glargine  30 Units SubCUTAneous BID    [Held by provider] pravastatin  80 mg Oral Daily    sucralfate  1 g Oral 4x Daily WC    sodium chloride flush  5-40 mL IntraVENous 2 times per day    enoxaparin  40 mg SubCUTAneous Nightly    guaiFENesin  1,200 mg Oral BID     Continuous Infusions:   sodium chloride Stopped (11/24/24 1913)    sodium chloride 1,000 mL (11/24/24 1945)    sodium chloride      dextrose       PRN Meds:  ipratropium 0.5 mg-albuterol 2.5 mg, nicotine polacrilex, HYDROmorphone, sodium chloride flush, sodium chloride, ondansetron **OR** ondansetron, polyethylene glycol, acetaminophen **OR** acetaminophen, glucose, dextrose bolus **OR** dextrose bolus, glucagon (rDNA), dextrose    Labs:  CBC:   Recent Labs     11/24/24  1538 11/25/24  0554 11/26/24  0541   WBC 28.4* 19.5* 18.5*   HGB 14.7 13.5 12.8*   HCT 44.3 41.0 38.0*   MCV 95.5 97.8 94.3    194 223     BMP:   Recent Labs     11/24/24  1538 11/25/24  0554 11/26/24  0541    138 138   K 4.1 3.8 3.6    104 104   CO2 27 24 23   BUN 28* 22* 18   CREATININE 0.9 0.7* 0.6*

## 2024-11-26 NOTE — TELEPHONE ENCOUNTER
Masoud Monsivais MD  [facility] Support  Needs chest ct non-contrast in 1 year for f/u pulmonary nodule with OV visit with any available pulmonary provider

## 2024-11-26 NOTE — PLAN OF CARE
Problem: Chronic Conditions and Co-morbidities  Goal: Patient's chronic conditions and co-morbidity symptoms are monitored and maintained or improved  11/25/2024 2042 by Rosa Elena Anderson RN  Outcome: Progressing  11/25/2024 1632 by Jas Nazario RN  Outcome: Progressing     Problem: Discharge Planning  Goal: Discharge to home or other facility with appropriate resources  Outcome: Progressing     Problem: Pain  Goal: Verbalizes/displays adequate comfort level or baseline comfort level  11/25/2024 2042 by Rosa Elena Anderson RN  Outcome: Progressing  11/25/2024 1632 by Jas Nazario RN  Outcome: Progressing     Problem: Skin/Tissue Integrity  Goal: Absence of new skin breakdown  Description: 1.  Monitor for areas of redness and/or skin breakdown  2.  Assess vascular access sites hourly  3.  Every 4-6 hours minimum:  Change oxygen saturation probe site  4.  Every 4-6 hours:  If on nasal continuous positive airway pressure, respiratory therapy assess nares and determine need for appliance change or resting period.  Outcome: Progressing     Problem: Safety - Adult  Goal: Free from fall injury  11/25/2024 2042 by Rosa Elena Anderson RN  Outcome: Progressing  11/25/2024 1632 by Jas Nazario RN  Outcome: Progressing

## 2024-11-26 NOTE — CONSULTS
CARDIOLOGY CONSULTATION        Patient Name: Terell Sykes  Date of admission: 11/24/2024  3:04 PM  Admission Dx: Lung nodule [R91.1]  Periumbilical hernia [K42.9]  Elevated troponin [R79.89]  Elevated brain natriuretic peptide (BNP) level [R79.89]  Fall, initial encounter [W19.XXXA]  Sepsis (HCC) [A41.9]  Severe sepsis (HCC) [A41.9, R65.20]  Traumatic rhabdomyolysis, initial encounter (HCC) [T79.6XXA]  Constipation, unspecified constipation type [K59.00]  Inguinal hernia without obstruction or gangrene, recurrence not specified, unspecified laterality [K40.90]  Community acquired pneumonia, unspecified laterality [J18.9]  Requesting Physician: Janine Ordoñez DO  Primary Care physician: Maryjane Ruvalcaba PA-C    Reason for Consultation/Chief Complaint: Syncope    History of Present Illness:     Terell Sykes is a 54 y.o. patient with past medical history of hypertension, hyperlipidemia, COPD, type 2 diabetes, GERD who presented to the hospital with complaints of a fall.  Patient states he stood up to drink milk when he began having back pain/back spasm.  Patient states he then lost his balance and he fell and hit his coffee table striking his face.  ED note reports loss of consciousness but patient adamantly denies.  Patient has since underwent CT imaging on neck and head which showed no fractures or acute abnormality.  Further workup showed sepsis, community-acquired pneumonia, rhabdomyolysis.  Patient reports he underwent left heart catheterization 7 years ago which showed 30% blockage and stated he did not need any stents.  He follows with cardiology at a private group in [place].  Patient denies any chest pain, dyspnea/dyspnea on exertion, palpitations, dizziness, near-syncope or collette syncope.  He denies PND, orthopnea, bendopnea increasing lower extreme edema or weight gain.    Patient states he does smoke 2 packs/day and has since he was 18 years of age.  States he has not drank alcohol in 5 years.  States  infiltrate right middle lobe medially.  Recommend follow-up with serial chest x-rays.     Questionable mild adrenal hyperplasia bilaterally     Small pulmonary nodules scattered in the right lung measuring up to 5 mm.  Recommend follow-up.    Impression and Plan:      Myocardial injury with elevated troponin secondary to supply/demand mismatch in setting of community-acquired pneumonia/rhabdomyolysis   Mechanical Fall with questionable LOC   -ED reports LOC   -Pt denies LOC  CAP  NOCAD by [facility] in 2017 per pt   -[facility]  Leukocytosis   Rhabdomyolysis  Elevated LFTs   Essential HTN  Tobacco abuse  Marijuana use    -Patient denies syncope.  Reports mechanical secondary to back spasm.  No anginal symptoms.  EKG without ischemic changes.  Troponin elevation secondary to supply/demand mismatch in setting of CAP/Rhabdomyolysis.  No indication for ischemic evaluation at this point given pneumonia.  Patient should follow-up with his outpatient cardiologist to determine the need for ischemic testing.  -Do recommend 81 mg aspirin given history of CAD by left heart catheter 2017.  Recommend statin when LFTs improve.  -Continue home verapamil  -check echocardiogram  -4 week ADILSON at discharge; will fax results to primary cardiologist   -If echocardiogram is unremarkable, cardiology will sign off.  Patient to follow-up with his primary cardiologist.        Patient Active Problem List   Diagnosis    Sepsis (HCC)    COPD (chronic obstructive pulmonary disease) (HCC)    Diabetes mellitus (HCC)    Gastroparesis    GERD (gastroesophageal reflux disease)    Hyperlipidemia    Hypertension    Rhabdomyolysis    Fall    Community acquired pneumonia    Constipation    Lung nodule    Tobacco abuse         I will address the patient's cardiac risk factors and adjusted pharmacologic treatment as needed. In addition, I have reinforced the need for patient directed risk factor modification.  All questions and concerns were addressed to

## 2024-11-26 NOTE — PROGRESS NOTES
Progress Note    Admit Date:  11/24/2024    Subjective:  Mr. Sykes was admitted to the hospital after he had a fall.  Patient had a workup in the ED as he had pain all over the body and some swelling of the right side of the face.  CT neck and CT of the maxillofacial bones showed no fractures.  Workup also showed that he had a cough.  Workup consistent with sepsis.  This morning he is feeling some better.  He still sore all over.  No chest pain.  No cough.  He is a smoker.  No diagnosis of COPD.    11/26- eager to go home. He denies LOC. Review of ER note shows LOC. He is not sure.   Overall much improved.    Objective:   BP (!) 156/74   Pulse 84   Temp 98.3 °F (36.8 °C) (Oral)   Resp 16   Ht 1.651 m (5' 5\")   Wt 92.9 kg (204 lb 11.2 oz)   SpO2 92%   BMI 34.06 kg/m²        Intake/Output Summary (Last 24 hours) at 11/26/2024 0811  Last data filed at 11/26/2024 0307  Gross per 24 hour   Intake 240 ml   Output 2800 ml   Net -2560 ml       Physical Exam:    General appearance: He is awake and alert.  Swelling of the right side of face with tenderness  Head: Normocephalic, without obvious abnormality, atraumatic  Eyes: conjunctivae/corneas clear. PERRL, EOM's intact.  Neck: no adenopathy, no carotid bruit, no JVD, supple, symmetrical, trachea midline and thyroid not enlarged, symmetric, no tenderness/mass/nodules  Lungs: No wheezes or rhonchi.  Few bibasilar crackles present.  No respiratory distress or accessory muscle use.  Heart: regular rate and rhythm, S1, S2 normal, no murmur, click, rub or gallop  Abdomen: soft, non-tender; bowel sounds normal; no masses,  no organomegaly  Extremities: extremities normal, atraumatic, no cyanosis or edema  Pulses: 2+ and symmetric  Skin: Skin color, texture, turgor normal. No rashes or lesions  Neurologic: Grossly normal    Scheduled Meds:   levoFLOXacin  750 mg Oral Daily    nicotine  1 patch TransDERmal Daily    verapamil  120 mg Oral BID    [Held by provider] docusate

## 2024-11-26 NOTE — PLAN OF CARE
Problem: Chronic Conditions and Co-morbidities  Goal: Patient's chronic conditions and co-morbidity symptoms are monitored and maintained or improved  11/26/2024 1653 by Jas Nazario RN  Outcome: Completed  11/26/2024 1134 by Jas Nazario RN  Outcome: Progressing     Problem: Discharge Planning  Goal: Discharge to home or other facility with appropriate resources  11/26/2024 1653 by Jas Nazario, RN  Outcome: Completed  11/26/2024 1134 by Jas Nazario RN  Outcome: Progressing     Problem: Pain  Goal: Verbalizes/displays adequate comfort level or baseline comfort level  Outcome: Completed     Problem: Skin/Tissue Integrity  Goal: Absence of new skin breakdown  Description: 1.  Monitor for areas of redness and/or skin breakdown  2.  Assess vascular access sites hourly  3.  Every 4-6 hours minimum:  Change oxygen saturation probe site  4.  Every 4-6 hours:  If on nasal continuous positive airway pressure, respiratory therapy assess nares and determine need for appliance change or resting period.  Outcome: Completed     Problem: Safety - Adult  Goal: Free from fall injury  Outcome: Completed

## 2024-11-26 NOTE — DISCHARGE INSTRUCTIONS
Will need to follow up with you primary cardiologist regarding your cardiac event monitor. These results will be faxed from [facility] to your cardiologist    Will need MRI per neurology

## 2024-11-26 NOTE — DISCHARGE SUMMARY
\"INR\" in the last 72 hours.  APTT: No results for input(s): \"APTT\" in the last 72 hours.  UA:  Recent Labs     11/25/24  0530   COLORU Yellow   PHUR 5.5  5.5   WBCUA 0-2   RBCUA 3-4   MUCUS Rare*   BACTERIA Rare*   CLARITYU Clear   LEUKOCYTESUR Negative   UROBILINOGEN 0.2   BILIRUBINUR Negative   BLOODU MODERATE*   GLUCOSEU >=1000*     Recent Labs     11/24/24  1538 11/25/24  0554 11/26/24  0541   CKTOTAL 7,051* 4,824* 1,866*        EEG    Signed         INTERPRETATION:  This 25-minute, computer-assisted video EEG recording is mildly abnormal.  It showed mild degree of generalized slowing background activity.  No potentially epileptiform activity was present during the recording.       The EEG findings were consistent with mild degree of generalized non-specific cerebral dysfunction.     CLASSIFICATION:  Dysrhythmia grade 1, generalized.  Sleep - unsuccessful.  EKG channel.     DESCRIPTION:     BACKGROUND:  The awake recording revealed 9 Hz alpha activity over the posterior head region.  There were increased 4 to 7 Hz theta activity into the EEG background.  Given the extensive study, the patient still did not sleep.  The EEG showed normal V waves during drowsiness.  There was no significant change on the EEG background with photic stimulation or hyperventilation.     INTERICTAL DISCHARGES: None     CLINICAL EVENTS:  None     The EKG channel was unremarkable.                XR KNEE LEFT (1-2 VIEWS)   Final Result   1. No acute findings.   2. Advanced arterial calcifications.         XR KNEE RIGHT (1-2 VIEWS)   Final Result   No acute abnormality         CT LUMBAR SPINE BONY RECONSTRUCTION   Final Result   1. No acute intra-abdominal or pelvic process.   2. No acute osseous abnormality of the thoracic spine.   3. No acute osseous abnormality of the lumbar spine.   4. L4-5 broad-based disc bulge and mild bilateral facet arthropathy. Mild   central stenosis and mild bilateral foraminal stenosis worse on the left.   5.  known as: REGLAN     pantoprazole 40 MG tablet  Commonly known as: PROTONIX     pravastatin 80 MG tablet  Commonly known as: PRAVACHOL     sertraline 100 MG tablet  Commonly known as: ZOLOFT     traZODone 100 MG tablet  Commonly known as: DESYREL               Where to Get Your Medications        These medications were sent to [address] [phone] - F [phone]  [address] [place] [address]      Phone: [phone]   aspirin 81 MG EC tablet  levoFLOXacin 750 MG tablet       These medications were sent to [facility]  - [facility] [address] - P [phone] - F [phone]  [address] [place] [address]      Phone: [phone]   ALPRAZolam 0.5 MG tablet           Discharge Condition/Location: Stable    Follow Up:  Follow up with PCP.    More than 30 mts spent    LÁZARO CABALLERO MD 11/26/2024 5:46 PM

## 2024-11-26 NOTE — PROGRESS NOTES
BP (!) 151/80   Pulse 84   Temp 98.1 °F (36.7 °C) (Oral)   Resp 18   Ht 1.651 m (5' 5\")   Wt 92.9 kg (204 lb 11.2 oz)   SpO2 96%   BMI 34.06 kg/m²     Pt awake in bed. Pt alert and orientedX4. Assessment complete. Meds passed. Pt denies needs at this time.        Bedside Mobility Assessment Tool (BMAT):     Assessment Level 1- Sit and Shake    1. From a semi-reclined position, ask patient to sit up and rotate to a seated position at the side of the bed. Can use the bedrail.    2. Ask patient to reach out and grab your hand and shake making sure patient reaches across his/her midline.   Pass- Patient is able to come to a seated position, maintain core strength. Maintains seated balance while reaching across midline. Move on to Assessment Level 2.     Assessment Level 2- Stretch and Point   1. With patient in seated position at the side of the bed, have patient place both feet on the floor (or stool) with knees no higher than hips.    2. Ask patient to stretch one leg and straighten the knee, then bend the ankle/flex and point the toes. If appropriate, repeat with the other leg.   Pass- Patient is able to demonstrate appropriate quad strength on intended weight bearing limb(s). Move onto Assessment Level 3.     Assessment Level 3- Stand   1. Ask patient to elevate off the bed or chair (seated to standing) using an assistive device (cane, bedrail).    2. Patient should be able to raise buttocks off be and hold for a count of five. May repeat once.   Pass- Patient maintains standing stability for at least 5 seconds, proceed to assessment level 4.    Assessment Level 4- Walk   1. Ask patient to march in place at bedside.    2. Then ask patient to advance step and return each foot. Some medical conditions may render a patient from stepping backwards, use your best clinical judgement.   Fail- Patient not able to complete tasks OR requires use of assistive device. Patient is MOBILITY LEVEL 3.       Mobility Level- 3

## 2024-11-26 NOTE — FLOWSHEET NOTE
11/25/24 2345   Vital Signs   Temp 97.8 °F (36.6 °C)   Temp Source Oral   Pulse 88   Heart Rate Source Monitor   Respirations 16   BP (!) 146/72   MAP (Calculated) 97   BP Location Left upper arm   BP Method Automatic   Patient Position Semi fowlers   Opioid-Induced Sedation   POSS Score 1   Oxygen Therapy   SpO2 95 %   O2 Device None (Room air)     Pt VS as shown above.

## 2024-11-26 NOTE — PROGRESS NOTES
Pt leaving via private vehicle to home. Discharge instructions given. Pt voiced understanding. Copy of discharge and scripts with patient. Iv removed. CP and PE completed. No further needs at discharge. Pt leaving with all personal belonging.     Patient aware to call neurology to follow up after MRI. Knows to not take xanax and drive, take  about 1 hour prior to MRI study

## 2024-11-26 NOTE — FLOWSHEET NOTE
11/26/24 0306   Vital Signs   Temp 98.3 °F (36.8 °C)   Temp Source Oral   Pulse 84   Heart Rate Source Monitor   Respirations 16   BP (!) 156/74   MAP (Calculated) 101   BP Location Left upper arm   BP Method Automatic   Patient Position Sitting   Opioid-Induced Sedation   POSS Score 1   Oxygen Therapy   SpO2 92 %   O2 Device None (Room air)     Pt VS as shown above.

## 2024-11-28 LAB
BACTERIA BLD CULT ORG #2: NORMAL
BACTERIA BLD CULT: NORMAL

## 2024-11-29 NOTE — TELEPHONE ENCOUNTER
Called patient to get him scheduled, patient asked me to call sister at [phone]. No answer, no vm.

## 2024-12-03 ENCOUNTER — TELEPHONE (OUTPATIENT)
Dept: CASE MANAGEMENT | Age: 54
End: 2024-12-03

## 2024-12-03 NOTE — TELEPHONE ENCOUNTER
Imaging report CT Chest 11/24/24 with f/u imaging recommendations sent to Maryjane GREGG(R)  Patient Navigator  Incidentals/Lung Navigation  [email or web address]

## 2024-12-13 ENCOUNTER — HOSPITAL ENCOUNTER (OUTPATIENT)
Dept: MRI IMAGING | Age: 54
Discharge: HOME | End: 2024-12-13
Attending: STUDENT IN AN ORGANIZED HEALTH CARE EDUCATION/TRAINING PROGRAM
Payer: MEDICAID

## 2024-12-13 DIAGNOSIS — R29.818 TRANSIENT NEUROLOGICAL SYMPTOMS: ICD-10-CM

## 2024-12-13 DIAGNOSIS — R53.1 WEAKNESS: ICD-10-CM

## 2024-12-13 PROCEDURE — 72141 MRI NECK SPINE W/O DYE: CPT

## 2024-12-18 ENCOUNTER — TELEPHONE (OUTPATIENT)
Dept: NEUROLOGY | Age: 54
End: 2024-12-18

## 2024-12-18 DIAGNOSIS — R93.7 ABNORMAL MRI, CERVICAL SPINE: Primary | ICD-10-CM

## 2024-12-18 NOTE — PROGRESS NOTES
Spoke with patient and patient's sister, both are aware of results and sister was provided central scheduling phone number to call and get additional testing done.

## 2024-12-18 NOTE — PROGRESS NOTES
MRI looks most consistent with cervical syrinx. Post contrast images needed to better characterize lesion.

## 2024-12-18 NOTE — TELEPHONE ENCOUNTER
Office received fax from radiology for Mri of cervical results. Patient is scheduled for 3 month follow up    Seen inpatient 11/26/24    Results scanned into media.    Please review.

## 2024-12-19 DIAGNOSIS — F40.240 CLAUSTROPHOBIA: Primary | ICD-10-CM

## 2024-12-19 RX ORDER — ALPRAZOLAM 0.5 MG
0.5 TABLET ORAL
Qty: 1 TABLET | Refills: 0 | Status: SHIPPED | OUTPATIENT
Start: 2024-12-19 | End: 2025-06-30

## 2024-12-19 NOTE — PROGRESS NOTES
I called Terell Sykes to discuss MRI result. MRI Cspine without contrast is abnormal suspicious for syrinx but needs post contrast imaging to better characterize. Recent head CT did not reveal chiari malformation. Ordered Xanax for MRI study which was helpful for previous MRI study (h/o claustrophobia).
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
## 2024-12-26 PROBLEM — R79.89 ELEVATED TROPONIN: Status: RESOLVED | Noted: 2024-11-26 | Resolved: 2024-12-26

## 2024-12-28 ENCOUNTER — HOSPITAL ENCOUNTER (OUTPATIENT)
Dept: MRI IMAGING | Age: 54
Discharge: HOME | End: 2024-12-28
Attending: STUDENT IN AN ORGANIZED HEALTH CARE EDUCATION/TRAINING PROGRAM
Payer: MEDICAID

## 2024-12-28 DIAGNOSIS — R93.7 ABNORMAL MRI, CERVICAL SPINE: ICD-10-CM

## 2024-12-28 PROCEDURE — A9579 GAD-BASE MR CONTRAST NOS,1ML: HCPCS | Performed by: STUDENT IN AN ORGANIZED HEALTH CARE EDUCATION/TRAINING PROGRAM

## 2024-12-28 PROCEDURE — 6360000004 HC RX CONTRAST MEDICATION: Performed by: STUDENT IN AN ORGANIZED HEALTH CARE EDUCATION/TRAINING PROGRAM

## 2024-12-28 PROCEDURE — 72142 MRI NECK SPINE W/DYE: CPT

## 2024-12-28 RX ADMIN — GADOTERIDOL 19 ML: 279.3 INJECTION, SOLUTION INTRAVENOUS at 11:33

## 2025-01-10 LAB — ECHO BSA: 2.06 M2

## 2025-01-10 PROCEDURE — 93272 ECG/REVIEW INTERPRET ONLY: CPT | Performed by: INTERNAL MEDICINE

## 2025-02-24 RX ORDER — ASPIRIN 81 MG/1
81 TABLET ORAL DAILY
Qty: 30 TABLET | Refills: 3 | OUTPATIENT
Start: 2025-02-24

## 2025-03-27 ENCOUNTER — HOSPITAL ENCOUNTER (OUTPATIENT)
Dept: CT IMAGING | Age: 55
Discharge: HOME | End: 2025-03-27
Attending: INTERNAL MEDICINE
Payer: MEDICAID

## 2025-03-27 DIAGNOSIS — D72.829 LEUKOCYTOSIS, UNSPECIFIED TYPE: ICD-10-CM

## 2025-03-27 PROCEDURE — 71260 CT THORAX DX C+: CPT

## 2025-03-27 PROCEDURE — 6360000004 HC RX CONTRAST MEDICATION: Performed by: INTERNAL MEDICINE

## 2025-03-27 RX ORDER — IOPAMIDOL 755 MG/ML
75 INJECTION, SOLUTION INTRAVASCULAR
Status: COMPLETED | OUTPATIENT
Start: 2025-03-27 | End: 2025-03-27

## 2025-03-27 RX ADMIN — IOPAMIDOL 75 ML: 755 INJECTION, SOLUTION INTRAVENOUS at 16:25

## 2025-04-07 RX ORDER — ASPIRIN 81 MG/1
81 TABLET ORAL DAILY
Qty: 30 TABLET | Refills: 3 | OUTPATIENT
Start: 2025-04-07

## 2025-06-18 RX ORDER — ASPIRIN 81 MG/1
81 TABLET ORAL DAILY
Qty: 30 TABLET | Refills: 3 | OUTPATIENT
Start: 2025-06-18

## (undated) DEVICE — Device

## (undated) DEVICE — KIT JACK TBL PT CARE

## (undated) DEVICE — SUTURE VCRL + SZ 0 L27IN ABSRB WHT CT-2 1/2 CIR TAPERPOINT VCP270H

## (undated) DEVICE — SUTURE VCRL + SZ 3-0 L27IN ABSRB UD L26MM SH 1/2 CIR VCP416H

## (undated) DEVICE — SOLUTION IV IRRIG POUR BRL 0.9% SODIUM CHL 2F7124

## (undated) DEVICE — GLOVE ORANGE PI 7 1/2   MSG9075

## (undated) DEVICE — SUTURE MCRYL + SZ 4-0 L18IN ABSRB UD L19MM PS-2 3/8 CIR MCP496G